# Patient Record
Sex: MALE | Race: WHITE | NOT HISPANIC OR LATINO
[De-identification: names, ages, dates, MRNs, and addresses within clinical notes are randomized per-mention and may not be internally consistent; named-entity substitution may affect disease eponyms.]

---

## 2017-04-14 ENCOUNTER — APPOINTMENT (OUTPATIENT)
Dept: ORTHOPEDIC SURGERY | Facility: CLINIC | Age: 62
End: 2017-04-14
Payer: COMMERCIAL

## 2017-04-14 VITALS
DIASTOLIC BLOOD PRESSURE: 85 MMHG | WEIGHT: 210 LBS | SYSTOLIC BLOOD PRESSURE: 134 MMHG | BODY MASS INDEX: 31.1 KG/M2 | HEIGHT: 69 IN

## 2017-04-14 DIAGNOSIS — M65.332 TRIGGER FINGER, LEFT MIDDLE FINGER: ICD-10-CM

## 2017-04-14 DIAGNOSIS — M65.351 TRIGGER FINGER, RIGHT LITTLE FINGER: ICD-10-CM

## 2017-04-14 PROCEDURE — 20550 NJX 1 TENDON SHEATH/LIGAMENT: CPT | Mod: F9

## 2017-04-14 PROCEDURE — 99204 OFFICE O/P NEW MOD 45 MIN: CPT | Mod: 25

## 2017-04-16 PROBLEM — M65.332 TRIGGER MIDDLE FINGER OF LEFT HAND: Status: ACTIVE | Noted: 2017-04-16

## 2017-04-16 PROBLEM — M65.351 TRIGGER LITTLE FINGER OF RIGHT HAND: Status: ACTIVE | Noted: 2017-04-16

## 2018-06-11 ENCOUNTER — APPOINTMENT (OUTPATIENT)
Age: 63
End: 2018-06-11
Payer: COMMERCIAL

## 2018-06-11 ENCOUNTER — APPOINTMENT (OUTPATIENT)
Dept: VASCULAR SURGERY | Facility: CLINIC | Age: 63
End: 2018-06-11
Payer: COMMERCIAL

## 2018-06-11 VITALS
HEIGHT: 69 IN | TEMPERATURE: 98 F | DIASTOLIC BLOOD PRESSURE: 91 MMHG | HEART RATE: 66 BPM | WEIGHT: 215 LBS | BODY MASS INDEX: 31.84 KG/M2 | SYSTOLIC BLOOD PRESSURE: 148 MMHG

## 2018-06-11 PROCEDURE — 93970 EXTREMITY STUDY: CPT

## 2018-06-11 PROCEDURE — 99204 OFFICE O/P NEW MOD 45 MIN: CPT

## 2018-12-14 ENCOUNTER — APPOINTMENT (OUTPATIENT)
Dept: ORTHOPEDIC SURGERY | Facility: CLINIC | Age: 63
End: 2018-12-14
Payer: COMMERCIAL

## 2018-12-14 VITALS
DIASTOLIC BLOOD PRESSURE: 77 MMHG | SYSTOLIC BLOOD PRESSURE: 125 MMHG | HEART RATE: 66 BPM | WEIGHT: 220 LBS | BODY MASS INDEX: 32.58 KG/M2 | HEIGHT: 69 IN

## 2018-12-14 DIAGNOSIS — M16.11 UNILATERAL PRIMARY OSTEOARTHRITIS, RIGHT HIP: ICD-10-CM

## 2018-12-14 PROCEDURE — 99214 OFFICE O/P EST MOD 30 MIN: CPT

## 2018-12-14 PROCEDURE — 73502 X-RAY EXAM HIP UNI 2-3 VIEWS: CPT | Mod: RT

## 2019-07-25 ENCOUNTER — LABORATORY RESULT (OUTPATIENT)
Age: 64
End: 2019-07-25

## 2019-07-26 ENCOUNTER — APPOINTMENT (OUTPATIENT)
Dept: INTERNAL MEDICINE | Facility: CLINIC | Age: 64
End: 2019-07-26
Payer: COMMERCIAL

## 2019-07-26 PROCEDURE — 36415 COLL VENOUS BLD VENIPUNCTURE: CPT

## 2019-07-30 ENCOUNTER — APPOINTMENT (OUTPATIENT)
Dept: INTERNAL MEDICINE | Facility: CLINIC | Age: 64
End: 2019-07-30
Payer: COMMERCIAL

## 2019-07-30 ENCOUNTER — NON-APPOINTMENT (OUTPATIENT)
Age: 64
End: 2019-07-30

## 2019-07-30 VITALS — DIASTOLIC BLOOD PRESSURE: 78 MMHG | SYSTOLIC BLOOD PRESSURE: 128 MMHG

## 2019-07-30 VITALS
HEART RATE: 57 BPM | DIASTOLIC BLOOD PRESSURE: 76 MMHG | HEIGHT: 69 IN | BODY MASS INDEX: 32.14 KG/M2 | SYSTOLIC BLOOD PRESSURE: 138 MMHG | WEIGHT: 217 LBS

## 2019-07-30 PROCEDURE — G0444 DEPRESSION SCREEN ANNUAL: CPT

## 2019-07-30 PROCEDURE — G0447 BEHAVIOR COUNSEL OBESITY 15M: CPT

## 2019-07-30 PROCEDURE — 93000 ELECTROCARDIOGRAM COMPLETE: CPT

## 2019-07-30 PROCEDURE — 99396 PREV VISIT EST AGE 40-64: CPT | Mod: 25

## 2019-07-30 PROCEDURE — G0442 ANNUAL ALCOHOL SCREEN 15 MIN: CPT

## 2019-07-30 PROCEDURE — 81003 URINALYSIS AUTO W/O SCOPE: CPT | Mod: NC,QW

## 2019-07-30 RX ORDER — MELOXICAM 15 MG/1
15 TABLET ORAL
Qty: 60 | Refills: 2 | Status: DISCONTINUED | COMMUNITY
Start: 2018-12-14 | End: 2019-07-30

## 2019-07-30 NOTE — HEALTH RISK ASSESSMENT
[Very Good] : ~his/her~  mood as very good [Yes] : Yes [Never (0 pts)] : Never (0 points) [No falls in past year] : Patient reported no falls in the past year [0] : 1) Little interest or pleasure doing things: Not at all (0) [None] : None [Alone] : lives alone [] :  [Employed] : employed [Smoke Detector] : smoke detector [Carbon Monoxide Detector] : carbon monoxide detector [Seat Belt] :  uses seat belt [Sunscreen] : uses sunscreen [de-identified] : raquet ball skiing, workout 2x a week [] : No [Audit-CScore] : 0 [Change in mental status noted] : No change in mental status noted [[id number]] : 0 [Reports changes in vision] : Reports no changes in vision [Reports changes in hearing] : Reports no changes in hearing [Reports changes in dental health] : Reports no changes in dental health [MammogramDate] : 2016 [ColonoscopyDate] : 10/2013 [FreeTextEntry2] : [employer] [FreeTextEntry3] : 1 grandson

## 2019-07-30 NOTE — COUNSELING
[ - Annual Depression Screening] : Annual Depression Screening [ - Annual Alcohol Misuse Screening] : Annual Alcohol Misuse Screening [ - Behavioral Counseling for Obesity (Face-to-Face for 15 Minutes)] : Behavioral Counseling for Obesity (Face-to-Face for 15 Minutes)

## 2019-07-30 NOTE — HISTORY OF PRESENT ILLNESS
[FreeTextEntry1] : feeling well, no gout attacks for at least last 6 months. \par  H/o DVT and pulmonary embolism 3 years ago mild residual swelling  right leg use supportive sock every day. Went skiing 1/2019- - very cold day and had difficulty catching breath.\par  Intermittent pain right leg shin on side, last 30 seconds and goes away.\par  Everything he eats gets lower abdominal gas pains. RLQ .

## 2019-08-07 ENCOUNTER — APPOINTMENT (OUTPATIENT)
Dept: CARDIOLOGY | Facility: CLINIC | Age: 64
End: 2019-08-07
Payer: COMMERCIAL

## 2019-08-07 PROCEDURE — 93015 CV STRESS TEST SUPVJ I&R: CPT

## 2019-08-07 PROCEDURE — 99214 OFFICE O/P EST MOD 30 MIN: CPT | Mod: 25

## 2019-08-14 LAB
25(OH)D3 SERPL-MCNC: 23 NG/ML
ALBUMIN SERPL ELPH-MCNC: 4.7 G/DL
ALP BLD-CCNC: 65 U/L
ALT SERPL-CCNC: 24 U/L
ANION GAP SERPL CALC-SCNC: 12 MMOL/L
AST SERPL-CCNC: 17 U/L
BASOPHILS # BLD AUTO: 0.07 K/UL
BASOPHILS NFR BLD AUTO: 0.9 %
BILIRUB SERPL-MCNC: 1 MG/DL
BILIRUB UR QL STRIP: NEGATIVE
BUN SERPL-MCNC: 24 MG/DL
CALCIUM SERPL-MCNC: 9.5 MG/DL
CHLORIDE SERPL-SCNC: 105 MMOL/L
CHOLEST SERPL-MCNC: 192 MG/DL
CHOLEST/HDLC SERPL: 4.5 RATIO
CO2 SERPL-SCNC: 25 MMOL/L
CREAT SERPL-MCNC: 1.19 MG/DL
EOSINOPHIL # BLD AUTO: 0.24 K/UL
EOSINOPHIL NFR BLD AUTO: 3.1 %
ESTIMATED AVERAGE GLUCOSE: 114 MG/DL
GLUCOSE SERPL-MCNC: 88 MG/DL
GLUCOSE UR-MCNC: NEGATIVE
HBA1C MFR BLD HPLC: 5.6 %
HCG UR QL: 0.2 EU/DL
HCT VFR BLD CALC: 49.9 %
HDLC SERPL-MCNC: 43 MG/DL
HGB BLD-MCNC: 16.4 G/DL
HGB UR QL STRIP.AUTO: NEGATIVE
IMM GRANULOCYTES NFR BLD AUTO: 0.3 %
KETONES UR-MCNC: NEGATIVE
LDLC SERPL CALC-MCNC: 120 MG/DL
LEUKOCYTE ESTERASE UR QL STRIP: NEGATIVE
LYMPHOCYTES # BLD AUTO: 2.54 K/UL
LYMPHOCYTES NFR BLD AUTO: 33.3 %
MAN DIFF?: NORMAL
MCHC RBC-ENTMCNC: 29.1 PG
MCHC RBC-ENTMCNC: 32.9 GM/DL
MCV RBC AUTO: 88.5 FL
MONOCYTES # BLD AUTO: 0.57 K/UL
MONOCYTES NFR BLD AUTO: 7.5 %
NEUTROPHILS # BLD AUTO: 4.18 K/UL
NEUTROPHILS NFR BLD AUTO: 54.9 %
NITRITE UR QL STRIP: NEGATIVE
PH UR STRIP: 6
PLATELET # BLD AUTO: 135 K/UL
POTASSIUM SERPL-SCNC: 5.4 MMOL/L
PROT SERPL-MCNC: 7 G/DL
PROT UR STRIP-MCNC: NEGATIVE
PSA SERPL-MCNC: 1.02 NG/ML
RBC # BLD: 5.64 M/UL
RBC # FLD: 12.7 %
SAVE SPECIMEN: NORMAL
SODIUM SERPL-SCNC: 142 MMOL/L
SP GR UR STRIP: 1.02
T4 FREE SERPL-MCNC: 1.1 NG/DL
T4 SERPL-MCNC: 6.9 UG/DL
TRIGL SERPL-MCNC: 144 MG/DL
TSH SERPL-ACNC: 1.86 UIU/ML
VIT B12 SERPL-MCNC: 613 PG/ML
WBC # FLD AUTO: 7.62 K/UL

## 2019-08-22 ENCOUNTER — APPOINTMENT (OUTPATIENT)
Dept: CARDIOLOGY | Facility: CLINIC | Age: 64
End: 2019-08-22

## 2019-08-22 ENCOUNTER — OUTPATIENT (OUTPATIENT)
Dept: OUTPATIENT SERVICES | Facility: HOSPITAL | Age: 64
LOS: 1 days | End: 2019-08-22
Payer: COMMERCIAL

## 2019-08-22 DIAGNOSIS — Z98.89 OTHER SPECIFIED POSTPROCEDURAL STATES: Chronic | ICD-10-CM

## 2019-08-22 DIAGNOSIS — R07.9 CHEST PAIN, UNSPECIFIED: ICD-10-CM

## 2019-08-22 PROCEDURE — 75574 CT ANGIO HRT W/3D IMAGE: CPT

## 2019-08-22 PROCEDURE — 75574 CT ANGIO HRT W/3D IMAGE: CPT | Mod: 26

## 2019-08-28 ENCOUNTER — APPOINTMENT (OUTPATIENT)
Dept: CARDIOLOGY | Facility: CLINIC | Age: 64
End: 2019-08-28
Payer: COMMERCIAL

## 2019-08-28 VITALS
SYSTOLIC BLOOD PRESSURE: 116 MMHG | WEIGHT: 219 LBS | DIASTOLIC BLOOD PRESSURE: 74 MMHG | BODY MASS INDEX: 32.34 KG/M2 | HEART RATE: 66 BPM

## 2019-08-28 DIAGNOSIS — L21.0 SEBORRHEA CAPITIS: ICD-10-CM

## 2019-08-28 PROCEDURE — 99214 OFFICE O/P EST MOD 30 MIN: CPT

## 2019-08-28 NOTE — HISTORY OF PRESENT ILLNESS
[FreeTextEntry1] : 63M remote hx of DVT/PE, who presents for follow up of abnormal stress test, cardiac CTA. Patient had an episode of dyspnea while skiing in very cold weather earlier this year. Underwent exercise stress test which was negative for ischemia but was noted with ventricular bigeminy during recovery. He subsequently underwent cardiac CTA which showed mild diffuse CAD. Currently feels well without chest pain, shortness of breath, palpitations, dizziness, syncope. He is pending colonoscopy.  Can walk up 2 flights of stairs without difficulty. \par \par In 2015, he developed a DVT and subsequent saddle PE after a flight to [place]. Had IVC filter placed and was put on Xarelto. Subsequently was taken off blood thinners and had filter removed. Thrombophilia workup was negative. Cancer screening at that time was negative. He is maintained on daily aspirin.\par \par Denies family hx of heart disease. Father had DVT. Owns a warehouse. Nonsmoker.  Works as  during the summer. \par \par ECG: SR, no ST-T wave changes\par EST 8/2019: 7:00 mins, no ischemia, Ventricular bigeminy during recovery\par TTE 2016: Normal LV function, mild LAE\par \par Cardiac CTA: CAC score 13\par LM <30%\par pLAD 30-49%\par mLAD <30%\par D1 <30%\par RI <30%\par LCx 30-49%\par RCA: Normal\par \par Asa 81mg

## 2019-08-28 NOTE — REVIEW OF SYSTEMS
[Fever] : no fever [Chills] : no chills [Shortness Of Breath] : no shortness of breath [Chest Pain] : no chest pain [Palpitations] : no palpitations [Negative] : Psychiatric

## 2019-08-28 NOTE — PHYSICAL EXAM
[General Appearance - Well Developed] : well developed [Normal Appearance] : normal appearance [Well Groomed] : well groomed [General Appearance - Well Nourished] : well nourished [No Deformities] : no deformities [General Appearance - In No Acute Distress] : no acute distress [Normal Conjunctiva] : the conjunctiva exhibited no abnormalities [Eyelids - No Xanthelasma] : the eyelids demonstrated no xanthelasmas [Normal Oral Mucosa] : normal oral mucosa [No Oral Pallor] : no oral pallor [No Oral Cyanosis] : no oral cyanosis [Normal Jugular Venous A Waves Present] : normal jugular venous A waves present [Normal Jugular Venous V Waves Present] : normal jugular venous V waves present [No Jugular Venous Diaz A Waves] : no jugular venous diaz A waves [Heart Rate And Rhythm] : heart rate and rhythm were normal [Heart Sounds] : normal S1 and S2 [Murmurs] : no murmurs present [Edema] : no peripheral edema present [Respiration, Rhythm And Depth] : normal respiratory rhythm and effort [Exaggerated Use Of Accessory Muscles For Inspiration] : no accessory muscle use [Auscultation Breath Sounds / Voice Sounds] : lungs were clear to auscultation bilaterally [Abdomen Soft] : soft [Abdomen Tenderness] : non-tender [Abdomen Mass (___ Cm)] : no abdominal mass palpated [Abnormal Walk] : normal gait [Gait - Sufficient For Exercise Testing] : the gait was sufficient for exercise testing [Nail Clubbing] : no clubbing of the fingernails [Cyanosis, Localized] : no localized cyanosis [Petechial Hemorrhages (___cm)] : no petechial hemorrhages [Skin Color & Pigmentation] : normal skin color and pigmentation [] : no rash [No Venous Stasis] : no venous stasis [Skin Lesions] : no skin lesions [No Skin Ulcers] : no skin ulcer [No Xanthoma] : no  xanthoma was observed [Oriented To Time, Place, And Person] : oriented to person, place, and time [Affect] : the affect was normal [Mood] : the mood was normal [No Anxiety] : not feeling anxious

## 2019-08-28 NOTE — DISCUSSION/SUMMARY
[FreeTextEntry1] : 63M with nonobstructive CAD on CTA, hx of DVT/PE\par \par 1. CAD:  Mild, nonobstructive. Asymptomatic. \par \par -Cont asa 81mg, start crestor 10mg\par -Trend lipids, CMP\par \par 2. DVT/PE:  Seemingly provoked from long plane flight. Father had a DVT.  Has chosen to dc AC without any subsequent issues.  \par \par -Cont asa 81mg\par \par This patient is able to perform >4 METS of activity without limitation. No evidence of ongoing ischemia, arrhythmia, valvular heart disease or decompensated heart failure.  This patient is optimized from a cardiac standpoint for this low risk surgery.  No further cardiac testing is necessary prior to surgery. \par \par

## 2019-08-29 RX ORDER — ROSUVASTATIN CALCIUM 10 MG/1
10 TABLET, FILM COATED ORAL
Qty: 90 | Refills: 3 | Status: DISCONTINUED | COMMUNITY
Start: 2019-08-28 | End: 2019-08-29

## 2019-09-12 ENCOUNTER — RECORD ABSTRACTING (OUTPATIENT)
Age: 64
End: 2019-09-12

## 2019-09-12 PROBLEM — Z86.718 HISTORY OF DEEP VENOUS THROMBOSIS: Status: RESOLVED | Noted: 2019-09-12 | Resolved: 2019-09-12

## 2019-09-12 PROBLEM — Z86.711 HISTORY OF PULMONARY EMBOLISM: Status: RESOLVED | Noted: 2019-09-12 | Resolved: 2019-09-12

## 2019-09-12 PROBLEM — K64.4 EXTERNAL HEMORRHOIDS: Status: ACTIVE | Noted: 2019-09-12

## 2019-09-12 PROBLEM — R23.4 SCAB: Status: RESOLVED | Noted: 2019-09-12 | Resolved: 2019-09-12

## 2019-09-12 PROBLEM — Z83.79 FAMILY HISTORY OF GALLBLADDER DISEASE: Status: ACTIVE | Noted: 2019-09-12

## 2019-09-12 PROBLEM — Z87.09 HISTORY OF UPPER RESPIRATORY INFECTION: Status: RESOLVED | Noted: 2019-09-12 | Resolved: 2019-09-12

## 2019-09-13 ENCOUNTER — APPOINTMENT (OUTPATIENT)
Dept: INTERNAL MEDICINE | Facility: AMBULATORY MEDICAL SERVICES | Age: 64
End: 2019-09-13

## 2019-09-13 ENCOUNTER — APPOINTMENT (OUTPATIENT)
Dept: INTERNAL MEDICINE | Facility: AMBULATORY MEDICAL SERVICES | Age: 64
End: 2019-09-13
Payer: COMMERCIAL

## 2019-09-13 DIAGNOSIS — Z83.79 FAMILY HISTORY OF OTHER DISEASES OF THE DIGESTIVE SYSTEM: ICD-10-CM

## 2019-09-13 DIAGNOSIS — K64.4 RESIDUAL HEMORRHOIDAL SKIN TAGS: ICD-10-CM

## 2019-09-13 DIAGNOSIS — R23.4 CHANGES IN SKIN TEXTURE: ICD-10-CM

## 2019-09-13 DIAGNOSIS — Z86.711 PERSONAL HISTORY OF PULMONARY EMBOLISM: ICD-10-CM

## 2019-09-13 DIAGNOSIS — Z87.09 PERSONAL HISTORY OF OTHER DISEASES OF THE RESPIRATORY SYSTEM: ICD-10-CM

## 2019-09-13 DIAGNOSIS — Z86.718 PERSONAL HISTORY OF OTHER VENOUS THROMBOSIS AND EMBOLISM: ICD-10-CM

## 2019-09-13 PROCEDURE — 43239 EGD BIOPSY SINGLE/MULTIPLE: CPT

## 2019-09-13 PROCEDURE — G0121 COLON CA SCRN NOT HI RSK IND: CPT | Mod: PT

## 2019-09-15 ENCOUNTER — EMERGENCY (EMERGENCY)
Facility: HOSPITAL | Age: 64
LOS: 1 days | Discharge: HOME | End: 2019-09-15
Attending: STUDENT IN AN ORGANIZED HEALTH CARE EDUCATION/TRAINING PROGRAM
Payer: COMMERCIAL

## 2019-09-15 VITALS
HEART RATE: 66 BPM | TEMPERATURE: 98 F | SYSTOLIC BLOOD PRESSURE: 160 MMHG | OXYGEN SATURATION: 99 % | DIASTOLIC BLOOD PRESSURE: 94 MMHG | HEIGHT: 69 IN | RESPIRATION RATE: 22 BRPM | WEIGHT: 216.93 LBS

## 2019-09-15 VITALS
RESPIRATION RATE: 16 BRPM | TEMPERATURE: 98 F | OXYGEN SATURATION: 98 % | HEART RATE: 78 BPM | SYSTOLIC BLOOD PRESSURE: 136 MMHG | DIASTOLIC BLOOD PRESSURE: 88 MMHG

## 2019-09-15 DIAGNOSIS — Z98.89 OTHER SPECIFIED POSTPROCEDURAL STATES: Chronic | ICD-10-CM

## 2019-09-15 LAB
ALBUMIN SERPL ELPH-MCNC: 4.3 G/DL — SIGNIFICANT CHANGE UP (ref 3.3–5)
ALP SERPL-CCNC: 63 U/L — SIGNIFICANT CHANGE UP (ref 40–120)
ALT FLD-CCNC: 39 U/L — SIGNIFICANT CHANGE UP (ref 10–45)
ANION GAP SERPL CALC-SCNC: 16 MMOL/L — SIGNIFICANT CHANGE UP (ref 5–17)
APPEARANCE UR: CLEAR — SIGNIFICANT CHANGE UP
AST SERPL-CCNC: 29 U/L — SIGNIFICANT CHANGE UP (ref 10–40)
BACTERIA # UR AUTO: NEGATIVE — SIGNIFICANT CHANGE UP
BASE EXCESS BLDV CALC-SCNC: -1.3 MMOL/L — SIGNIFICANT CHANGE UP (ref -2–2)
BASE EXCESS BLDV CALC-SCNC: 1.5 MMOL/L — SIGNIFICANT CHANGE UP (ref -2–2)
BASOPHILS # BLD AUTO: 0.1 K/UL — SIGNIFICANT CHANGE UP (ref 0–0.2)
BASOPHILS NFR BLD AUTO: 0.7 % — SIGNIFICANT CHANGE UP (ref 0–2)
BILIRUB SERPL-MCNC: 0.4 MG/DL — SIGNIFICANT CHANGE UP (ref 0.2–1.2)
BILIRUB UR-MCNC: NEGATIVE — SIGNIFICANT CHANGE UP
BUN SERPL-MCNC: 26 MG/DL — HIGH (ref 7–23)
CA-I SERPL-SCNC: 1.11 MMOL/L — LOW (ref 1.12–1.3)
CA-I SERPL-SCNC: 1.11 MMOL/L — LOW (ref 1.12–1.3)
CALCIUM SERPL-MCNC: 9.2 MG/DL — SIGNIFICANT CHANGE UP (ref 8.4–10.5)
CHLORIDE BLDV-SCNC: 106 MMOL/L — SIGNIFICANT CHANGE UP (ref 96–108)
CHLORIDE BLDV-SCNC: 108 MMOL/L — SIGNIFICANT CHANGE UP (ref 96–108)
CHLORIDE SERPL-SCNC: 100 MMOL/L — SIGNIFICANT CHANGE UP (ref 96–108)
CO2 BLDV-SCNC: 23 MMOL/L — SIGNIFICANT CHANGE UP (ref 22–30)
CO2 BLDV-SCNC: 26 MMOL/L — SIGNIFICANT CHANGE UP (ref 22–30)
CO2 SERPL-SCNC: 22 MMOL/L — SIGNIFICANT CHANGE UP (ref 22–31)
COLOR SPEC: YELLOW — SIGNIFICANT CHANGE UP
CREAT SERPL-MCNC: 1.38 MG/DL — HIGH (ref 0.5–1.3)
DIFF PNL FLD: ABNORMAL
EOSINOPHIL # BLD AUTO: 0.1 K/UL — SIGNIFICANT CHANGE UP (ref 0–0.5)
EOSINOPHIL NFR BLD AUTO: 1.4 % — SIGNIFICANT CHANGE UP (ref 0–6)
EPI CELLS # UR: 1 /HPF — SIGNIFICANT CHANGE UP
GAS PNL BLDV: 139 MMOL/L — SIGNIFICANT CHANGE UP (ref 135–145)
GAS PNL BLDV: 140 MMOL/L — SIGNIFICANT CHANGE UP (ref 135–145)
GAS PNL BLDV: SIGNIFICANT CHANGE UP
GLUCOSE BLDV-MCNC: 153 MG/DL — HIGH (ref 70–99)
GLUCOSE BLDV-MCNC: 155 MG/DL — HIGH (ref 70–99)
GLUCOSE SERPL-MCNC: 156 MG/DL — HIGH (ref 70–99)
GLUCOSE UR QL: NEGATIVE — SIGNIFICANT CHANGE UP
HCO3 BLDV-SCNC: 22 MMOL/L — SIGNIFICANT CHANGE UP (ref 21–29)
HCO3 BLDV-SCNC: 25 MMOL/L — SIGNIFICANT CHANGE UP (ref 21–29)
HCT VFR BLD CALC: 49.8 % — SIGNIFICANT CHANGE UP (ref 39–50)
HCT VFR BLDA CALC: 50 % — SIGNIFICANT CHANGE UP (ref 39–50)
HCT VFR BLDA CALC: 50 % — SIGNIFICANT CHANGE UP (ref 39–50)
HGB BLD CALC-MCNC: 16.2 G/DL — SIGNIFICANT CHANGE UP (ref 13–17)
HGB BLD CALC-MCNC: 16.2 G/DL — SIGNIFICANT CHANGE UP (ref 13–17)
HGB BLD-MCNC: 16.3 G/DL — SIGNIFICANT CHANGE UP (ref 13–17)
HYALINE CASTS # UR AUTO: 1 /LPF — SIGNIFICANT CHANGE UP (ref 0–2)
KETONES UR-MCNC: NEGATIVE — SIGNIFICANT CHANGE UP
LACTATE BLDV-MCNC: 4 MMOL/L — CRITICAL HIGH (ref 0.7–2)
LACTATE BLDV-MCNC: 4.2 MMOL/L — CRITICAL HIGH (ref 0.7–2)
LEUKOCYTE ESTERASE UR-ACNC: NEGATIVE — SIGNIFICANT CHANGE UP
LIDOCAIN IGE QN: 22 U/L — SIGNIFICANT CHANGE UP (ref 7–60)
LYMPHOCYTES # BLD AUTO: 2.6 K/UL — SIGNIFICANT CHANGE UP (ref 1–3.3)
LYMPHOCYTES # BLD AUTO: 29.5 % — SIGNIFICANT CHANGE UP (ref 13–44)
MCHC RBC-ENTMCNC: 29.1 PG — SIGNIFICANT CHANGE UP (ref 27–34)
MCHC RBC-ENTMCNC: 32.9 GM/DL — SIGNIFICANT CHANGE UP (ref 32–36)
MCV RBC AUTO: 88.7 FL — SIGNIFICANT CHANGE UP (ref 80–100)
MONOCYTES # BLD AUTO: 0.6 K/UL — SIGNIFICANT CHANGE UP (ref 0–0.9)
MONOCYTES NFR BLD AUTO: 6.9 % — SIGNIFICANT CHANGE UP (ref 2–14)
NEUTROPHILS # BLD AUTO: 5.5 K/UL — SIGNIFICANT CHANGE UP (ref 1.8–7.4)
NEUTROPHILS NFR BLD AUTO: 61.6 % — SIGNIFICANT CHANGE UP (ref 43–77)
NITRITE UR-MCNC: NEGATIVE — SIGNIFICANT CHANGE UP
OTHER CELLS CSF MANUAL: 13 ML/DL — LOW (ref 18–22)
OTHER CELLS CSF MANUAL: 16 ML/DL — LOW (ref 18–22)
PCO2 BLDV: 36 MMHG — SIGNIFICANT CHANGE UP (ref 35–50)
PCO2 BLDV: 39 MMHG — SIGNIFICANT CHANGE UP (ref 35–50)
PH BLDV: 7.41 — SIGNIFICANT CHANGE UP (ref 7.35–7.45)
PH BLDV: 7.43 — SIGNIFICANT CHANGE UP (ref 7.35–7.45)
PH UR: 6 — SIGNIFICANT CHANGE UP (ref 5–8)
PLATELET # BLD AUTO: 128 K/UL — LOW (ref 150–400)
PO2 BLDV: 30 MMHG — SIGNIFICANT CHANGE UP (ref 25–45)
PO2 BLDV: 39 MMHG — SIGNIFICANT CHANGE UP (ref 25–45)
POTASSIUM BLDV-SCNC: 3.8 MMOL/L — SIGNIFICANT CHANGE UP (ref 3.5–5.3)
POTASSIUM BLDV-SCNC: 3.8 MMOL/L — SIGNIFICANT CHANGE UP (ref 3.5–5.3)
POTASSIUM SERPL-MCNC: 4.2 MMOL/L — SIGNIFICANT CHANGE UP (ref 3.5–5.3)
POTASSIUM SERPL-SCNC: 4.2 MMOL/L — SIGNIFICANT CHANGE UP (ref 3.5–5.3)
PROT SERPL-MCNC: 7.1 G/DL — SIGNIFICANT CHANGE UP (ref 6–8.3)
PROT UR-MCNC: ABNORMAL
RBC # BLD: 5.61 M/UL — SIGNIFICANT CHANGE UP (ref 4.2–5.8)
RBC # FLD: 11.8 % — SIGNIFICANT CHANGE UP (ref 10.3–14.5)
RBC CASTS # UR COMP ASSIST: 195 /HPF — HIGH (ref 0–4)
SAO2 % BLDV: 58 % — LOW (ref 67–88)
SAO2 % BLDV: 73 % — SIGNIFICANT CHANGE UP (ref 67–88)
SODIUM SERPL-SCNC: 138 MMOL/L — SIGNIFICANT CHANGE UP (ref 135–145)
SP GR SPEC: 1.02 — SIGNIFICANT CHANGE UP (ref 1.01–1.02)
UROBILINOGEN FLD QL: NEGATIVE — SIGNIFICANT CHANGE UP
WBC # BLD: 9 K/UL — SIGNIFICANT CHANGE UP (ref 3.8–10.5)
WBC # FLD AUTO: 9 K/UL — SIGNIFICANT CHANGE UP (ref 3.8–10.5)
WBC UR QL: 3 /HPF — SIGNIFICANT CHANGE UP (ref 0–5)

## 2019-09-15 PROCEDURE — 99284 EMERGENCY DEPT VISIT MOD MDM: CPT | Mod: 25

## 2019-09-15 PROCEDURE — 80053 COMPREHEN METABOLIC PANEL: CPT

## 2019-09-15 PROCEDURE — 82330 ASSAY OF CALCIUM: CPT

## 2019-09-15 PROCEDURE — 96375 TX/PRO/DX INJ NEW DRUG ADDON: CPT | Mod: XU

## 2019-09-15 PROCEDURE — 96376 TX/PRO/DX INJ SAME DRUG ADON: CPT | Mod: XU

## 2019-09-15 PROCEDURE — 74174 CTA ABD&PLVS W/CONTRAST: CPT

## 2019-09-15 PROCEDURE — 83605 ASSAY OF LACTIC ACID: CPT

## 2019-09-15 PROCEDURE — 99285 EMERGENCY DEPT VISIT HI MDM: CPT

## 2019-09-15 PROCEDURE — 99283 EMERGENCY DEPT VISIT LOW MDM: CPT

## 2019-09-15 PROCEDURE — 83690 ASSAY OF LIPASE: CPT

## 2019-09-15 PROCEDURE — 93005 ELECTROCARDIOGRAM TRACING: CPT

## 2019-09-15 PROCEDURE — 82947 ASSAY GLUCOSE BLOOD QUANT: CPT

## 2019-09-15 PROCEDURE — 82435 ASSAY OF BLOOD CHLORIDE: CPT

## 2019-09-15 PROCEDURE — 96361 HYDRATE IV INFUSION ADD-ON: CPT

## 2019-09-15 PROCEDURE — 87086 URINE CULTURE/COLONY COUNT: CPT

## 2019-09-15 PROCEDURE — 84295 ASSAY OF SERUM SODIUM: CPT

## 2019-09-15 PROCEDURE — 74176 CT ABD & PELVIS W/O CONTRAST: CPT | Mod: 26

## 2019-09-15 PROCEDURE — 96374 THER/PROPH/DIAG INJ IV PUSH: CPT | Mod: XU

## 2019-09-15 PROCEDURE — 85027 COMPLETE CBC AUTOMATED: CPT

## 2019-09-15 PROCEDURE — 82803 BLOOD GASES ANY COMBINATION: CPT

## 2019-09-15 PROCEDURE — 84132 ASSAY OF SERUM POTASSIUM: CPT

## 2019-09-15 PROCEDURE — 81001 URINALYSIS AUTO W/SCOPE: CPT

## 2019-09-15 PROCEDURE — 74174 CTA ABD&PLVS W/CONTRAST: CPT | Mod: 26

## 2019-09-15 PROCEDURE — 85014 HEMATOCRIT: CPT

## 2019-09-15 PROCEDURE — 74176 CT ABD & PELVIS W/O CONTRAST: CPT

## 2019-09-15 RX ORDER — MORPHINE SULFATE 50 MG/1
4 CAPSULE, EXTENDED RELEASE ORAL ONCE
Refills: 0 | Status: DISCONTINUED | OUTPATIENT
Start: 2019-09-15 | End: 2019-09-15

## 2019-09-15 RX ORDER — ONDANSETRON 8 MG/1
4 TABLET, FILM COATED ORAL ONCE
Refills: 0 | Status: COMPLETED | OUTPATIENT
Start: 2019-09-15 | End: 2019-09-15

## 2019-09-15 RX ORDER — SODIUM CHLORIDE 9 MG/ML
1000 INJECTION INTRAMUSCULAR; INTRAVENOUS; SUBCUTANEOUS ONCE
Refills: 0 | Status: COMPLETED | OUTPATIENT
Start: 2019-09-15 | End: 2019-09-15

## 2019-09-15 RX ORDER — KETOROLAC TROMETHAMINE 30 MG/ML
15 SYRINGE (ML) INJECTION ONCE
Refills: 0 | Status: DISCONTINUED | OUTPATIENT
Start: 2019-09-15 | End: 2019-09-15

## 2019-09-15 RX ORDER — SODIUM CHLORIDE 9 MG/ML
2000 INJECTION INTRAMUSCULAR; INTRAVENOUS; SUBCUTANEOUS ONCE
Refills: 0 | Status: COMPLETED | OUTPATIENT
Start: 2019-09-15 | End: 2019-09-15

## 2019-09-15 RX ADMIN — Medication 15 MILLIGRAM(S): at 11:12

## 2019-09-15 RX ADMIN — SODIUM CHLORIDE 1000 MILLILITER(S): 9 INJECTION INTRAMUSCULAR; INTRAVENOUS; SUBCUTANEOUS at 06:35

## 2019-09-15 RX ADMIN — SODIUM CHLORIDE 2000 MILLILITER(S): 9 INJECTION INTRAMUSCULAR; INTRAVENOUS; SUBCUTANEOUS at 09:35

## 2019-09-15 RX ADMIN — MORPHINE SULFATE 4 MILLIGRAM(S): 50 CAPSULE, EXTENDED RELEASE ORAL at 08:00

## 2019-09-15 RX ADMIN — ONDANSETRON 4 MILLIGRAM(S): 8 TABLET, FILM COATED ORAL at 05:52

## 2019-09-15 RX ADMIN — SODIUM CHLORIDE 1000 MILLILITER(S): 9 INJECTION INTRAMUSCULAR; INTRAVENOUS; SUBCUTANEOUS at 06:31

## 2019-09-15 RX ADMIN — MORPHINE SULFATE 4 MILLIGRAM(S): 50 CAPSULE, EXTENDED RELEASE ORAL at 09:50

## 2019-09-15 RX ADMIN — MORPHINE SULFATE 4 MILLIGRAM(S): 50 CAPSULE, EXTENDED RELEASE ORAL at 07:50

## 2019-09-15 RX ADMIN — MORPHINE SULFATE 4 MILLIGRAM(S): 50 CAPSULE, EXTENDED RELEASE ORAL at 08:12

## 2019-09-15 RX ADMIN — SODIUM CHLORIDE 1000 MILLILITER(S): 9 INJECTION INTRAMUSCULAR; INTRAVENOUS; SUBCUTANEOUS at 05:55

## 2019-09-15 RX ADMIN — MORPHINE SULFATE 4 MILLIGRAM(S): 50 CAPSULE, EXTENDED RELEASE ORAL at 05:53

## 2019-09-15 RX ADMIN — MORPHINE SULFATE 4 MILLIGRAM(S): 50 CAPSULE, EXTENDED RELEASE ORAL at 06:31

## 2019-09-15 NOTE — ED PROVIDER NOTE - ATTENDING CONTRIBUTION TO CARE
64 M presents w/ wife   has hx of kidney stones, required lithotripsy in the past presents today w/ L flank pain that is severe woke him up from sleep colicky radiating to the front of abdomen,  no fevers, no chills  no vomiting  on exam, pt is awake alert in severe distress has soft abdomen, L flank pain,   pt has clear lungs, bilateral 2+ radial and DP pulses. plan for labs, urine imaging and pain control, likely nephrolithiasis.   case signed out to Dr. Faith at 0700 on Sept 15, 2019 pending ct read,   pt may require observation stay given intense pain he was experiencing for further monitoring, 2 rounds of IV morphine given.

## 2019-09-15 NOTE — ED PROVIDER NOTE - NSFOLLOWUPCLINICS_GEN_ALL_ED_FT
[facility] - Urology  Urology  [address], [address] & [address] [place] [address]  Phone: [phone]  Fax:   Follow Up Time:

## 2019-09-15 NOTE — ED PROVIDER NOTE - OBJECTIVE STATEMENT
64M PMH renal stones, recent colposcopy w/ biopsy 2 days ago p/w LLQ pain. Pt states he woke up from LLQ pain, crampy, severe, radiating to back. Associated with nausea, 1x NBNB vomiting. No fevers, chills, no blood in urine/stool. States feels like previous renal stones.

## 2019-09-15 NOTE — ED ADULT NURSE REASSESSMENT NOTE - NS ED NURSE REASSESS COMMENT FT1
1909 pt states 5/10 on his pain, more manageable, pending final dispo/no distress noted/family at bedside

## 2019-09-15 NOTE — ED PROVIDER NOTE - PHYSICAL EXAMINATION
General: Well developed, well nourished, uncomfortable appearing   HEENT: Normocephalic and atraumatic, EOMI, Trachea midline.   Cardiac: Normal S1 and S2 w/ RRR. No MRG.  Pulmonary: CTA bilaterally. No increased WOB.   Abdominal: Soft, NTND, No CVA tenderness.   Neurologic: No focal sensory or motor deficits.  Musculoskeletal: No limited ROM.  Vascular: Warm and well perfused  Skin: Color appropriate for race.   Psychiatric: Appropriate mood and affect. No apparent risk to self or others.  García Mireles, PGY-2 General: Well developed, well nourished, uncomfortable appearing   HEENT: Normocephalic and atraumatic, EOMI, Trachea midline.   Cardiac: Normal S1 and S2 w/ RRR. No MRG.  Pulmonary: CTA bilaterally. No increased WOB.   Abdominal: Soft, NTND, L CVA tenderness.   Neurologic: No focal sensory or motor deficits.  Musculoskeletal: No limited ROM.  Vascular: Warm and well perfused  Skin: Color appropriate for race.   Psychiatric: Appropriate mood and affect. No apparent risk to self or others.  García Mireles, PGY-2

## 2019-09-15 NOTE — ED ADULT NURSE NOTE - CAS ELECT INFOMATION PROVIDED
DC instructions/Pt discharged, VSS, afebrile, ambulating independently. Nurse clearly reviewed discharge instructions, followup care, and when to return to the ED - Pt verbalized understanding.

## 2019-09-15 NOTE — CONSULT NOTE ADULT - SUBJECTIVE AND OBJECTIVE BOX
HPI:  65yo male h/o PE/DVT in past, on aspirin, and kidney stones, presenting to ED complaining of LLQ pain since late last night, found to have a 2mm left distal stone on CT. Pt states pain is LLQ and radiates to left flank. Pain is associated with chills and nausea. Denies vomiting, fevers, dysuria, hematuria, frequency or difficulty emptying bladder. Last stone was about 5 years ago, passed on own. Also required ESWL about 18 years ago. Pt also underwent recent colonoscopy 2 days ago with polyp removal. No post procedure abx.   States pain is currently 5/10.     CT in ED shows a 2mm left distal stone with mild hydro.     PAST MEDICAL & SURGICAL HISTORY:  Epistaxis  Gout: right toe  Saddle embolus:   Pulmonary embolism  Herniated lumbar intervertebral disc  Spinal stenosis  DVT (deep venous thrombosis)  S/P IVC filter: located right groin   place on 2015  History of orthopedic surgery    FAMILY HISTORY:  Family history of lymphoma (Sibling)  Family history of DVT (Father, Grandparent)    SOCIAL HISTORY:   Tobacco hx:    MEDICATIONS  (STANDING):  ketorolac   Injectable 15 milliGRAM(s) IV Push Once  sodium chloride 0.9% Bolus 2000 milliLiter(s) IV Bolus once    MEDICATIONS  (PRN):    Allergies    No Known Allergies    Intolerances        REVIEW OF SYSTEMS: Pertinent positives and negatives as stated in HPI, otherwise negative    Vital signs  T(C): 36.9 (09-15-19 @ 08:58), Max: 36.9 (09-15-19 @ 08:58)  HR: 78 (09-15-19 @ 08:58)  BP: 161/92 (09-15-19 @ 08:58)  SpO2: 98% (09-15-19 @ 08:58)  Wt(kg): --    Output    UOP    Physical Exam  Gen: NAD  Pulm: No respiratory distress, no subcostal retractions  CV: RRR, no JVD  Abd: Soft, NT, ND, no CVAT    LABS:          09-15 @ 06:12    WBC 9.0   / Hct 49.8  / SCr 1.38     09-15    138  |  100  |  26<H>  ----------------------------<  156<H>  4.2   |  22  |  1.38<H>    Ca    9.2      15 Sep 2019 06:12    TPro  7.1  /  Alb  4.3  /  TBili  0.4  /  DBili  x   /  AST  29  /  ALT  39  /  AlkPhos  63  09-15      Urinalysis Basic - ( 15 Sep 2019 06:12 )    Color: Yellow / Appearance: Clear / S.025 / pH: x  Gluc: x / Ketone: Negative  / Bili: Negative / Urobili: Negative   Blood: x / Protein: 30 mg/dL / Nitrite: Negative   Leuk Esterase: Negative / RBC: 195 /hpf / WBC 3 /HPF   Sq Epi: x / Non Sq Epi: 1 /hpf / Bacteria: Negative        Urine Cx: pending     RADIOLOGY:    < from: CT Abdomen and Pelvis No Cont (09.15.19 @ 06:51) >  FINDINGS:    LOWER CHEST: Subsegmental atelectasis.    LIVER: Within normal limits.  BILE DUCTS: Normal caliber.  GALLBLADDER: Within normal limits.  SPLEEN: Within normal limits.  PANCREAS: Within normal limits.  ADRENALS: Within normal limits.  KIDNEYS/URETERS:  A 2 mm calculi within the distal portion of the left   ureter. Multiple other punctate nonobstructing left renal calculi.   Multiple nonobstructing right renal calculi with the largest in the lower   pole measuring 7 mm. mild left hydroureteronephrosis. Bilateral   nonspecific perinephric stranding.    BLADDER: Within normal limits.  REPRODUCTIVE ORGANS: Prostate within normal limits.    BOWEL: Sigmoid diverticulosis. No bowel obstruction. Appendix is normal.  PERITONEUM: No ascites.  VESSELS: Atherosclerotic changes.  RETROPERITONEUM/LYMPH NODES: No lymphadenopathy.    ABDOMINAL WALL: Fat-containing umbilical hernia.  BONES: Degenerative changes.    IMPRESSION:   A 2 mm calculi within the distal portion of the left ureter. Mild left   hydroureteronephrosis.    Multiple other bilateral nonobstructing renal calculi with the largest   measuring 7 mm in the right renal lower pole.         < end of copied text >

## 2019-09-15 NOTE — ED PROVIDER NOTE - PROGRESS NOTE DETAILS
UA notable for hematuria, elevated creatinine. Pending CT results. May require observation for pain control.   aGrcía Mireles, PGY-2 Leoncio Lopez DO PGY2 - Pt now resting comforatbly pain free. Lactate downtrending. Per patient had metallic clip placed in colon during colonscopy 2 days ago which explains radiology  findings. Safe for dc. pt agrees Bceca Quiroz MD s/p morphine reports feeling improved, pt is more comfortable and resting comfortably, abdominal exam- remains soft nd

## 2019-09-15 NOTE — CONSULT NOTE ADULT - ASSESSMENT
63yo male with LLQ pain radiating to left flank, CT showing a 2mm distal left stone with mild hydro, also elevated lactate of 4, but no fevers/leukocytosis, UA clean  - hydrate/IVF  - repeat labs, trend lactate   - pain control  - flomax  - strain urine  - f/u CTA abd/pelvis- ED concern for mesenteric ischemia given elevated lactate and pain, also DVT/PE history   - CDU if only finding is nephrolithiasis   - discussed with Dr. KYLE Salazar

## 2019-09-15 NOTE — ED ADULT NURSE NOTE - NS ED NURSE LEVEL OF CONSCIOUSNESS ORIENTATION
Oriented - self; Oriented - place; Oriented - time

## 2019-09-15 NOTE — ED PROVIDER NOTE - NSFOLLOWUPINSTRUCTIONS_ED_ALL_ED_FT
- Follow up with a urologist this week if symptoms persist    - Lab and imaging results, if performed, were discussed with you along with your discharge diagnosis    - Follow up with your doctor in 1 week - bring copies of your results if you were given. If you do not have a primary doctor, please call [phone] to find one convenient for you    - Return to the ED for any new, worsening, or concerning symptoms to you    - Continue all prescribed medications    - Take ibuprofen/tylenol as directed as needed for pain    - Rest and keep yourself hydrated with fluids

## 2019-09-15 NOTE — ED PROVIDER NOTE - PATIENT PORTAL LINK FT
You can access the FollowMyHealth Patient Portal offered by [facility] by registering at the following website: [email or web address]. By joining [email or web address] FollowMyHealth portal, you will also be able to view your health information using other applications (apps) compatible with our system.

## 2019-09-15 NOTE — ED ADULT NURSE NOTE - PMH
DVT (deep venous thrombosis)    Epistaxis    Gout  right toe  Herniated lumbar intervertebral disc    Pulmonary embolism    Saddle embolus  sept 16th  Spinal stenosis

## 2019-09-16 LAB
CULTURE RESULTS: SIGNIFICANT CHANGE UP
SPECIMEN SOURCE: SIGNIFICANT CHANGE UP

## 2019-10-03 ENCOUNTER — LABORATORY RESULT (OUTPATIENT)
Age: 64
End: 2019-10-03

## 2019-10-03 ENCOUNTER — APPOINTMENT (OUTPATIENT)
Dept: INTERNAL MEDICINE | Facility: CLINIC | Age: 64
End: 2019-10-03
Payer: COMMERCIAL

## 2019-10-03 VITALS
HEIGHT: 69 IN | DIASTOLIC BLOOD PRESSURE: 81 MMHG | BODY MASS INDEX: 32.44 KG/M2 | SYSTOLIC BLOOD PRESSURE: 136 MMHG | HEART RATE: 68 BPM | OXYGEN SATURATION: 95 % | WEIGHT: 219 LBS

## 2019-10-03 DIAGNOSIS — J45.909 UNSPECIFIED ASTHMA, UNCOMPLICATED: ICD-10-CM

## 2019-10-03 LAB
BILIRUB UR QL STRIP: NEGATIVE
GLUCOSE UR-MCNC: NEGATIVE
HCG UR QL: 0.2 EU/DL
HGB UR QL STRIP.AUTO: NEGATIVE
KETONES UR-MCNC: NEGATIVE
LEUKOCYTE ESTERASE UR QL STRIP: NEGATIVE
NITRITE UR QL STRIP: NEGATIVE
PH UR STRIP: 7
PROT UR STRIP-MCNC: NEGATIVE
SP GR UR STRIP: 1.02

## 2019-10-03 PROCEDURE — 99214 OFFICE O/P EST MOD 30 MIN: CPT | Mod: 25

## 2019-10-03 PROCEDURE — 36415 COLL VENOUS BLD VENIPUNCTURE: CPT

## 2019-10-03 PROCEDURE — 81003 URINALYSIS AUTO W/O SCOPE: CPT | Mod: NC,QW

## 2019-10-03 NOTE — PHYSICAL EXAM
[No Acute Distress] : no acute distress [Well Nourished] : well nourished [Well Developed] : well developed [Well-Appearing] : well-appearing [Normal Sclera/Conjunctiva] : normal sclera/conjunctiva [PERRL] : pupils equal round and reactive to light [EOMI] : extraocular movements intact [Normal Outer Ear/Nose] : the outer ears and nose were normal in appearance [Normal Oropharynx] : the oropharynx was normal [No JVD] : no jugular venous distention [Supple] : supple [No Lymphadenopathy] : no lymphadenopathy [Thyroid Normal, No Nodules] : the thyroid was normal and there were no nodules present [No Respiratory Distress] : no respiratory distress  [No Accessory Muscle Use] : no accessory muscle use [Normal Rate] : normal rate  [Clear to Auscultation] : lungs were clear to auscultation bilaterally [Regular Rhythm] : with a regular rhythm [Normal S1, S2] : normal S1 and S2 [No Murmur] : no murmur heard [No Carotid Bruits] : no carotid bruits [No Abdominal Bruit] : a ~M bruit was not heard ~T in the abdomen [No Varicosities] : no varicosities [No Edema] : there was no peripheral edema [Pedal Pulses Present] : the pedal pulses are present [No Palpable Aorta] : no palpable aorta [No Extremity Clubbing/Cyanosis] : no extremity clubbing/cyanosis [Soft] : abdomen soft [Non-distended] : non-distended [Non Tender] : non-tender [No HSM] : no HSM [No Masses] : no abdominal mass palpated [Normal Bowel Sounds] : normal bowel sounds [Normal Posterior Cervical Nodes] : no posterior cervical lymphadenopathy [Normal Anterior Cervical Nodes] : no anterior cervical lymphadenopathy [No CVA Tenderness] : no CVA  tenderness [No Spinal Tenderness] : no spinal tenderness [No Joint Swelling] : no joint swelling [Grossly Normal Strength/Tone] : grossly normal strength/tone [No Rash] : no rash [No Focal Deficits] : no focal deficits [Coordination Grossly Intact] : coordination grossly intact [Normal Gait] : normal gait [Deep Tendon Reflexes (DTR)] : deep tendon reflexes were 2+ and symmetric [Normal Affect] : the affect was normal [Normal Insight/Judgement] : insight and judgment were intact [de-identified] : tender in suprapubic area.

## 2019-10-03 NOTE — HISTORY OF PRESENT ILLNESS
[FreeTextEntry1] : Saturday AM had pain in mid lower abdomen. constant. went to . told trace blood, and sonogram was normal. Pain resolve yesterday. Sunday AM awoke in sweat, and and also Monday awoke in sweat in middle of the night. After colonoscopy awoke with Kidney stone and was hospitalized and given pain meds and passed stone on Wednesday

## 2019-10-04 LAB
ALBUMIN SERPL ELPH-MCNC: 4.6 G/DL
ALP BLD-CCNC: 74 U/L
ALT SERPL-CCNC: 32 U/L
ANION GAP SERPL CALC-SCNC: 12 MMOL/L
AST SERPL-CCNC: 18 U/L
BASOPHILS # BLD AUTO: 0.03 K/UL
BASOPHILS NFR BLD AUTO: 0.4 %
BILIRUB SERPL-MCNC: 0.5 MG/DL
BUN SERPL-MCNC: 23 MG/DL
CALCIUM SERPL-MCNC: 9.6 MG/DL
CHLORIDE SERPL-SCNC: 105 MMOL/L
CO2 SERPL-SCNC: 25 MMOL/L
CREAT SERPL-MCNC: 1.06 MG/DL
EOSINOPHIL # BLD AUTO: 0 K/UL
EOSINOPHIL NFR BLD AUTO: 0 %
GLUCOSE SERPL-MCNC: 134 MG/DL
HCT VFR BLD CALC: 47.3 %
HGB BLD-MCNC: 15.2 G/DL
IMM GRANULOCYTES NFR BLD AUTO: 0.5 %
LYMPHOCYTES # BLD AUTO: 1.14 K/UL
LYMPHOCYTES NFR BLD AUTO: 14.2 %
MAN DIFF?: NORMAL
MCHC RBC-ENTMCNC: 28.8 PG
MCHC RBC-ENTMCNC: 32.1 GM/DL
MCV RBC AUTO: 89.8 FL
MONOCYTES # BLD AUTO: 0.21 K/UL
MONOCYTES NFR BLD AUTO: 2.6 %
NEUTROPHILS # BLD AUTO: 6.63 K/UL
NEUTROPHILS NFR BLD AUTO: 82.3 %
PLATELET # BLD AUTO: 181 K/UL
POTASSIUM SERPL-SCNC: 5.1 MMOL/L
PROT SERPL-MCNC: 7.2 G/DL
RBC # BLD: 5.27 M/UL
RBC # FLD: 12.2 %
SODIUM SERPL-SCNC: 142 MMOL/L
URATE SERPL-MCNC: 6.9 MG/DL
WBC # FLD AUTO: 8.05 K/UL

## 2019-11-12 ENCOUNTER — NON-APPOINTMENT (OUTPATIENT)
Age: 64
End: 2019-11-12

## 2019-11-12 ENCOUNTER — APPOINTMENT (OUTPATIENT)
Dept: INTERNAL MEDICINE | Facility: CLINIC | Age: 64
End: 2019-11-12
Payer: COMMERCIAL

## 2019-11-12 VITALS
SYSTOLIC BLOOD PRESSURE: 131 MMHG | BODY MASS INDEX: 31.84 KG/M2 | DIASTOLIC BLOOD PRESSURE: 83 MMHG | HEIGHT: 69 IN | HEART RATE: 71 BPM | WEIGHT: 215 LBS

## 2019-11-12 DIAGNOSIS — I82.401 ACUTE EMBOLISM AND THROMBOSIS OF UNSPECIFIED DEEP VEINS OF RIGHT LOWER EXTREMITY: ICD-10-CM

## 2019-11-12 DIAGNOSIS — R10.9 UNSPECIFIED ABDOMINAL PAIN: ICD-10-CM

## 2019-11-12 DIAGNOSIS — Z87.898 PERSONAL HISTORY OF OTHER SPECIFIED CONDITIONS: ICD-10-CM

## 2019-11-12 DIAGNOSIS — Z01.818 ENCOUNTER FOR OTHER PREPROCEDURAL EXAMINATION: ICD-10-CM

## 2019-11-12 PROCEDURE — 99214 OFFICE O/P EST MOD 30 MIN: CPT | Mod: 25

## 2019-11-12 PROCEDURE — 93000 ELECTROCARDIOGRAM COMPLETE: CPT

## 2019-11-12 PROCEDURE — 36415 COLL VENOUS BLD VENIPUNCTURE: CPT

## 2019-11-12 RX ORDER — SODIUM PICOSULFATE, MAGNESIUM OXIDE, AND ANHYDROUS CITRIC ACID 10; 3.5; 12 MG/160ML; G/160ML; G/160ML
10-3.5-12 MG-GM LIQUID ORAL
Qty: 1 | Refills: 0 | Status: DISCONTINUED | COMMUNITY
Start: 2019-08-19 | End: 2019-11-12

## 2019-11-12 RX ORDER — FEBUXOSTAT 40 MG/1
40 TABLET ORAL DAILY
Refills: 0 | Status: DISCONTINUED | COMMUNITY
End: 2019-11-12

## 2019-11-12 RX ORDER — HYDROCORTISONE 25 MG/G
2.5 CREAM TOPICAL 3 TIMES DAILY
Refills: 0 | Status: DISCONTINUED | COMMUNITY
End: 2019-11-12

## 2019-11-12 RX ORDER — METHYLPREDNISOLONE 4 MG/1
4 TABLET ORAL
Refills: 0 | Status: DISCONTINUED | COMMUNITY
End: 2019-11-12

## 2019-11-12 RX ORDER — PREDNISONE 10 MG/1
10 TABLET ORAL
Refills: 0 | Status: DISCONTINUED | COMMUNITY
End: 2019-11-12

## 2019-11-12 RX ORDER — ASPIRIN 81 MG
81 TABLET, DELAYED RELEASE (ENTERIC COATED) ORAL
Refills: 0 | Status: ACTIVE | COMMUNITY

## 2019-11-12 NOTE — PHYSICAL EXAM
[No Acute Distress] : no acute distress [Normal Outer Ear/Nose] : the outer ears and nose were normal in appearance [No JVD] : no jugular venous distention [No Respiratory Distress] : no respiratory distress  [No Accessory Muscle Use] : no accessory muscle use [Clear to Auscultation] : lungs were clear to auscultation bilaterally [Normal Rate] : normal rate  [Normal Percussion] : the chest was normal to percussion [No Murmur] : no murmur heard [Normal S1, S2] : normal S1 and S2 [Regular Rhythm] : with a regular rhythm [Pedal Pulses Present] : the pedal pulses are present [No Abdominal Bruit] : a ~M bruit was not heard ~T in the abdomen [No Edema] : there was no peripheral edema [Soft] : abdomen soft [Normal Appearance] : normal in appearance [Non Tender] : non-tender [Normal Bowel Sounds] : normal bowel sounds [No HSM] : no HSM [Normal Supraclavicular Nodes] : no supraclavicular lymphadenopathy [Normal Posterior Cervical Nodes] : no posterior cervical lymphadenopathy [Normal Anterior Cervical Nodes] : no anterior cervical lymphadenopathy [No Joint Swelling] : no joint swelling [No Rash] : no rash [Memory Grossly Normal] : memory grossly normal [Speech Grossly Normal] : speech grossly normal [Alert and Oriented x3] : oriented to person, place, and time [Normal Affect] : the affect was normal [Normal Mood] : the mood was normal [Normal Insight/Judgement] : insight and judgment were intact

## 2019-11-13 ENCOUNTER — APPOINTMENT (OUTPATIENT)
Dept: INTERNAL MEDICINE | Facility: CLINIC | Age: 64
End: 2019-11-13

## 2019-11-13 LAB
ALBUMIN SERPL ELPH-MCNC: 4.3 G/DL
ALP BLD-CCNC: 63 U/L
ALT SERPL-CCNC: 24 U/L
ANION GAP SERPL CALC-SCNC: 13 MMOL/L
APPEARANCE: CLEAR
APTT BLD: 29.1 SEC
AST SERPL-CCNC: 18 U/L
BASOPHILS # BLD AUTO: 0.07 K/UL
BASOPHILS NFR BLD AUTO: 0.9 %
BILIRUB SERPL-MCNC: 0.6 MG/DL
BILIRUBIN URINE: NEGATIVE
BLOOD URINE: NEGATIVE
BUN SERPL-MCNC: 22 MG/DL
CALCIUM SERPL-MCNC: 9.1 MG/DL
CHLORIDE SERPL-SCNC: 105 MMOL/L
CO2 SERPL-SCNC: 23 MMOL/L
COLOR: NORMAL
CREAT SERPL-MCNC: 1.11 MG/DL
EOSINOPHIL # BLD AUTO: 0.15 K/UL
EOSINOPHIL NFR BLD AUTO: 1.9 %
GLUCOSE QUALITATIVE U: NEGATIVE
GLUCOSE SERPL-MCNC: 98 MG/DL
HCT VFR BLD CALC: 47.7 %
HGB BLD-MCNC: 15.4 G/DL
IMM GRANULOCYTES NFR BLD AUTO: 0.5 %
INR PPP: 0.93 RATIO
KETONES URINE: NEGATIVE
LEUKOCYTE ESTERASE URINE: NEGATIVE
LYMPHOCYTES # BLD AUTO: 2.68 K/UL
LYMPHOCYTES NFR BLD AUTO: 33.1 %
MAN DIFF?: NORMAL
MCHC RBC-ENTMCNC: 28.6 PG
MCHC RBC-ENTMCNC: 32.3 GM/DL
MCV RBC AUTO: 88.7 FL
MONOCYTES # BLD AUTO: 0.5 K/UL
MONOCYTES NFR BLD AUTO: 6.2 %
NEUTROPHILS # BLD AUTO: 4.65 K/UL
NEUTROPHILS NFR BLD AUTO: 57.4 %
NITRITE URINE: NEGATIVE
PH URINE: 5
PLATELET # BLD AUTO: 155 K/UL
POTASSIUM SERPL-SCNC: 4.5 MMOL/L
PROT SERPL-MCNC: 6.9 G/DL
PROTEIN URINE: NEGATIVE
PT BLD: 10.5 SEC
RBC # BLD: 5.38 M/UL
RBC # FLD: 12.7 %
SODIUM SERPL-SCNC: 141 MMOL/L
SPECIFIC GRAVITY URINE: 1.02
UROBILINOGEN URINE: NORMAL
WBC # FLD AUTO: 8.09 K/UL

## 2019-11-13 NOTE — PLAN
[FreeTextEntry1] : The patient is cleared for surgery from a cardiac and general medical point of view.

## 2019-11-13 NOTE — CONSULT LETTER
[Dear  ___] : Dear  [unfilled], [Please see my note below.] : Please see my note below. [Consult Letter:] : I had the pleasure of evaluating your patient, [unfilled]. [Consult Closing:] : Thank you very much for allowing me to participate in the care of this patient.  If you have any questions, please do not hesitate to contact me. [Sincerely,] : Sincerely, [FreeTextEntry3] : Radhika Dia MD

## 2019-11-13 NOTE — HISTORY OF PRESENT ILLNESS
[Coronary Artery Disease] : coronary artery disease [(Patient denies any chest pain, claudication, dyspnea on exertion, orthopnea, palpitations or syncope)] : Patient denies any chest pain, claudication, dyspnea on exertion, orthopnea, palpitations or syncope [Excellent (>10 METs)] : Excellent (>10 METs) [Anti-Platelet Agents: _____] : Anti-Platelet Agents: [unfilled] [Aortic Stenosis] : no aortic stenosis [Atrial Fibrillation] : no atrial fibrillation [Recent Myocardial Infarction] : no recent myocardial infarction [Implantable Device/Pacemaker] : no implantable device/pacemaker [FreeTextEntry1] : lithotripsy [FreeTextEntry2] : 11/15/2019 [FreeTextEntry3] : Ricardo Cherry MD [FreeTextEntry7] : CT angio of coronaries showed mild lesion in the proximal LAD and LCx, estimated at 30-49%.  Patient has no symptoms of chest pain or p[ressure, dyspnea at rest or exertion, palpitations\par He had a saddle pulmonary embolus with right lower extremity DVT in 2015 after a trip to [place]. He was put on lovenox and then had an IVC filter which was removed after 1-2 months. No symptoms since. Workup for hypercoagulability was negative.  [FreeTextEntry4] : Had lithotripsy done about 25 years ago. Has several large ureteral stones on the right that are too large to pass.

## 2019-11-13 NOTE — RESULTS/DATA
[de-identified] : normal [] : results reviewed [de-identified] : normal [de-identified] : normal [de-identified] : RSR WNL

## 2019-11-14 LAB — BACTERIA UR CULT: NORMAL

## 2020-05-21 ENCOUNTER — TRANSCRIPTION ENCOUNTER (OUTPATIENT)
Age: 65
End: 2020-05-21

## 2020-08-13 ENCOUNTER — RX RENEWAL (OUTPATIENT)
Age: 65
End: 2020-08-13

## 2020-08-20 ENCOUNTER — APPOINTMENT (OUTPATIENT)
Dept: INTERNAL MEDICINE | Facility: CLINIC | Age: 65
End: 2020-08-20
Payer: COMMERCIAL

## 2020-08-20 ENCOUNTER — NON-APPOINTMENT (OUTPATIENT)
Age: 65
End: 2020-08-20

## 2020-08-20 ENCOUNTER — APPOINTMENT (OUTPATIENT)
Dept: CARDIOLOGY | Facility: CLINIC | Age: 65
End: 2020-08-20
Payer: COMMERCIAL

## 2020-08-20 VITALS
SYSTOLIC BLOOD PRESSURE: 137 MMHG | HEIGHT: 69 IN | BODY MASS INDEX: 32.58 KG/M2 | WEIGHT: 220 LBS | DIASTOLIC BLOOD PRESSURE: 82 MMHG | HEART RATE: 69 BPM

## 2020-08-20 VITALS — SYSTOLIC BLOOD PRESSURE: 134 MMHG | DIASTOLIC BLOOD PRESSURE: 81 MMHG | HEART RATE: 61 BPM

## 2020-08-20 DIAGNOSIS — M54.30 SCIATICA, UNSPECIFIED SIDE: ICD-10-CM

## 2020-08-20 DIAGNOSIS — Z12.11 ENCOUNTER FOR SCREENING FOR MALIGNANT NEOPLASM OF COLON: ICD-10-CM

## 2020-08-20 DIAGNOSIS — Z12.12 ENCOUNTER FOR SCREENING FOR MALIGNANT NEOPLASM OF COLON: ICD-10-CM

## 2020-08-20 DIAGNOSIS — N23 UNSPECIFIED RENAL COLIC: ICD-10-CM

## 2020-08-20 LAB
BILIRUB UR QL STRIP: NEGATIVE
CLARITY UR: CLEAR
GLUCOSE UR-MCNC: NEGATIVE
HCG UR QL: 0.2 EU/DL
HGB UR QL STRIP.AUTO: NEGATIVE
KETONES UR-MCNC: NEGATIVE
LEUKOCYTE ESTERASE UR QL STRIP: NEGATIVE
NITRITE UR QL STRIP: NEGATIVE
PH UR STRIP: 5.5
PROT UR STRIP-MCNC: NEGATIVE
SP GR UR STRIP: 1.03

## 2020-08-20 PROCEDURE — 99397 PER PM REEVAL EST PAT 65+ YR: CPT | Mod: 25

## 2020-08-20 PROCEDURE — 99213 OFFICE O/P EST LOW 20 MIN: CPT

## 2020-08-20 PROCEDURE — 90670 PCV13 VACCINE IM: CPT

## 2020-08-20 PROCEDURE — 36415 COLL VENOUS BLD VENIPUNCTURE: CPT

## 2020-08-20 PROCEDURE — 81003 URINALYSIS AUTO W/O SCOPE: CPT | Mod: NC,QW

## 2020-08-20 PROCEDURE — G0009: CPT

## 2020-08-20 PROCEDURE — G0447 BEHAVIOR COUNSEL OBESITY 15M: CPT

## 2020-08-20 PROCEDURE — G0444 DEPRESSION SCREEN ANNUAL: CPT

## 2020-08-20 PROCEDURE — 93000 ELECTROCARDIOGRAM COMPLETE: CPT

## 2020-08-20 PROCEDURE — G0442 ANNUAL ALCOHOL SCREEN 15 MIN: CPT

## 2020-08-20 RX ORDER — UBIDECARENONE/VIT E ACET 100MG-5
CAPSULE ORAL
Refills: 0 | Status: DISCONTINUED | COMMUNITY
End: 2020-08-20

## 2020-08-20 RX ORDER — OMEGA-3/DHA/EPA/FISH OIL 300-1000MG
CAPSULE ORAL
Refills: 0 | Status: DISCONTINUED | COMMUNITY
End: 2020-08-20

## 2020-08-20 RX ORDER — MULTIVITAMIN
TABLET ORAL
Refills: 0 | Status: DISCONTINUED | COMMUNITY
End: 2020-08-20

## 2020-08-20 RX ORDER — CHERRY FLAVOR
LIQUID (ML) ORAL
Refills: 0 | Status: DISCONTINUED | COMMUNITY
End: 2020-08-20

## 2020-08-20 NOTE — DISCUSSION/SUMMARY
[FreeTextEntry1] : 64M with nonobstructive CAD on CTA, hx of DVT/PE\par \par 1. CAD:  Mild, nonobstructive. Asymptomatic. \par \par -Cont asa 81mg, crestor 10mg\par -Trend lipids, CMP\par \par 2. DVT/PE:  Seemingly provoked from long plane flight. Father had a DVT.  Has chosen to dc AC without any subsequent issues.  \par \par -Cont asa 81mg\par \par 3. LAE: Serial echocardiograms\par \par RV 6 months \par \par

## 2020-08-20 NOTE — PHYSICAL EXAM
[General Appearance - Well Developed] : well developed [Normal Appearance] : normal appearance [Well Groomed] : well groomed [General Appearance - Well Nourished] : well nourished [No Deformities] : no deformities [General Appearance - In No Acute Distress] : no acute distress [Normal Conjunctiva] : the conjunctiva exhibited no abnormalities [Eyelids - No Xanthelasma] : the eyelids demonstrated no xanthelasmas [Normal Oral Mucosa] : normal oral mucosa [No Oral Pallor] : no oral pallor [No Oral Cyanosis] : no oral cyanosis [Normal Jugular Venous A Waves Present] : normal jugular venous A waves present [Normal Jugular Venous V Waves Present] : normal jugular venous V waves present [No Jugular Venous Diaz A Waves] : no jugular venous diaz A waves [Respiration, Rhythm And Depth] : normal respiratory rhythm and effort [Exaggerated Use Of Accessory Muscles For Inspiration] : no accessory muscle use [Auscultation Breath Sounds / Voice Sounds] : lungs were clear to auscultation bilaterally [Heart Rate And Rhythm] : heart rate and rhythm were normal [Heart Sounds] : normal S1 and S2 [Murmurs] : no murmurs present [Abdomen Soft] : soft [Edema] : no peripheral edema present [Abdomen Tenderness] : non-tender [Gait - Sufficient For Exercise Testing] : the gait was sufficient for exercise testing [Abnormal Walk] : normal gait [Abdomen Mass (___ Cm)] : no abdominal mass palpated [Nail Clubbing] : no clubbing of the fingernails [Petechial Hemorrhages (___cm)] : no petechial hemorrhages [Cyanosis, Localized] : no localized cyanosis [Skin Color & Pigmentation] : normal skin color and pigmentation [No Venous Stasis] : no venous stasis [Skin Lesions] : no skin lesions [] : no rash [No Skin Ulcers] : no skin ulcer [Oriented To Time, Place, And Person] : oriented to person, place, and time [No Xanthoma] : no  xanthoma was observed [Mood] : the mood was normal [Affect] : the affect was normal [No Anxiety] : not feeling anxious

## 2020-08-20 NOTE — COUNSELING
[Potential consequences of obesity discussed] : Potential consequences of obesity discussed [Encouraged to use exercise tracking device] : Encouraged to use exercise tracking device [Benefits of weight loss discussed] : Benefits of weight loss discussed [Good understanding] : Patient has a good understanding of disease, goals and obesity follow-up plan

## 2020-08-20 NOTE — HISTORY OF PRESENT ILLNESS
[FreeTextEntry1] : s/p lithotripsy Never went back for f/u. H/o DVT and PE 4 years ago\par  otherwise feels well. Has some mild lower extermity edema. Rare gout attacks

## 2020-08-20 NOTE — REVIEW OF SYSTEMS
[Negative] : Psychiatric [Fever] : no fever [Chills] : no chills [Shortness Of Breath] : no shortness of breath [Chest Pain] : no chest pain [Palpitations] : no palpitations

## 2020-08-20 NOTE — HEALTH RISK ASSESSMENT
[Very Good] : ~his/her~  mood as very good [No] : In the past 12 months have you used drugs other than those required for medical reasons? No [No falls in past year] : Patient reported no falls in the past year [0] : 2) Feeling down, depressed, or hopeless: Not at all (0) [No Retinopathy] : No retinopathy [Patient reported colonoscopy was normal] : Patient reported colonoscopy was normal [None] : None [With Significant Other] : lives with significant other [Employed] : employed [] :  [# Of Children ___] : has [unfilled] children [Sexually Active] : sexually active [Feels Safe at Home] : Feels safe at home [Fully functional (using the telephone, shopping, preparing meals, housekeeping, doing laundry, using] : Fully functional and needs no help or supervision to perform IADLs (using the telephone, shopping, preparing meals, housekeeping, doing laundry, using transportation, managing medications and managing finances) [Fully functional (bathing, dressing, toileting, transferring, walking, feeding)] : Fully functional (bathing, dressing, toileting, transferring, walking, feeding) [Smoke Detector] : smoke detector [Carbon Monoxide Detector] : carbon monoxide detector [Seat Belt] :  uses seat belt [Sunscreen] : uses sunscreen [] : No [Audit-CScore] : 0 [[id number]] : 0 [de-identified] : no [EyeExamDate] : 2019 [Change in mental status noted] : No change in mental status noted [High Risk Behavior] : no high risk behavior [Reports changes in hearing] : Reports no changes in hearing [Reports changes in dental health] : Reports no changes in dental health [Reports changes in vision] : Reports no changes in vision [ColonoscopyDate] : 9/2019 [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

## 2020-08-20 NOTE — HISTORY OF PRESENT ILLNESS
[FreeTextEntry1] : 64M remote hx of DVT/PE, mild CAD who presents for follow up\par \par Started on lipitor for mild CAD on CTA. \par Occasional muscle aches, otherwise denies CP, SOB, dizziness, lightheadedness, syncope. \par \par HISTORY:\par \par Early 2019- Patient had an episode of dyspnea while skiing in very cold weather. Underwent exercise stress test which was negative for ischemia but was noted with ventricular bigeminy during recovery. He subsequently underwent cardiac CTA which showed mild diffuse CAD. Currently feels well without chest pain, shortness of breath, palpitations, dizziness, syncope. He is pending colonoscopy.  Can walk up 2 flights of stairs without difficulty. \par \par In 2015, he developed a DVT and subsequent saddle PE after a flight to [place]. Had IVC filter placed and was put on Xarelto. Subsequently was taken off blood thinners and had filter removed. Thrombophilia workup was negative. Cancer screening at that time was negative. He is maintained on daily aspirin.\par \par Denies family hx of heart disease. Father had DVT. Owns a warehouse. Nonsmoker.  Works as  during the winter\par \par ECG: SR, no ST-T wave changes\par EST 8/2019: 7:00 mins, no ischemia, Ventricular bigeminy during recovery\par TTE 2016: Normal LV function, mild LAE\par \par Cardiac CTA: CAC score 13\par LM <30%\par pLAD 30-49%\par mLAD <30%\par D1 <30%\par RI <30%\par LCx 30-49%\par RCA: Normal\par \par Asa 81mg, atorvastatin 10mg

## 2020-08-27 DIAGNOSIS — D72.829 ELEVATED WHITE BLOOD CELL COUNT, UNSPECIFIED: ICD-10-CM

## 2020-08-27 LAB
25(OH)D3 SERPL-MCNC: 28.8 NG/ML
ALBUMIN SERPL ELPH-MCNC: 4.7 G/DL
ALP BLD-CCNC: 61 U/L
ALT SERPL-CCNC: 31 U/L
ANION GAP SERPL CALC-SCNC: 15 MMOL/L
AST SERPL-CCNC: 24 U/L
BASOPHILS # BLD AUTO: 0.06 K/UL
BASOPHILS NFR BLD AUTO: 0.5 %
BILIRUB SERPL-MCNC: 0.9 MG/DL
BUN SERPL-MCNC: 20 MG/DL
CALCIUM SERPL-MCNC: 9.3 MG/DL
CHLORIDE SERPL-SCNC: 105 MMOL/L
CHOLEST SERPL-MCNC: 148 MG/DL
CHOLEST/HDLC SERPL: 3.5 RATIO
CO2 SERPL-SCNC: 22 MMOL/L
CREAT SERPL-MCNC: 1.07 MG/DL
EOSINOPHIL # BLD AUTO: 0.09 K/UL
EOSINOPHIL NFR BLD AUTO: 0.8 %
ESTIMATED AVERAGE GLUCOSE: 120 MG/DL
GLUCOSE SERPL-MCNC: 89 MG/DL
HBA1C MFR BLD HPLC: 5.8 %
HCT VFR BLD CALC: 52.5 %
HDLC SERPL-MCNC: 43 MG/DL
HGB BLD-MCNC: 16.6 G/DL
IMM GRANULOCYTES NFR BLD AUTO: 0.4 %
LDLC SERPL CALC-MCNC: 74 MG/DL
LYMPHOCYTES # BLD AUTO: 2.17 K/UL
LYMPHOCYTES NFR BLD AUTO: 18.5 %
MAN DIFF?: NORMAL
MCHC RBC-ENTMCNC: 29.1 PG
MCHC RBC-ENTMCNC: 31.6 GM/DL
MCV RBC AUTO: 92.1 FL
MONOCYTES # BLD AUTO: 0.69 K/UL
MONOCYTES NFR BLD AUTO: 5.9 %
NEUTROPHILS # BLD AUTO: 8.65 K/UL
NEUTROPHILS NFR BLD AUTO: 73.9 %
PLATELET # BLD AUTO: 139 K/UL
POTASSIUM SERPL-SCNC: 5.6 MMOL/L
PROT SERPL-MCNC: 7 G/DL
PSA SERPL-MCNC: 1.13 NG/ML
RBC # BLD: 5.7 M/UL
RBC # FLD: 12.5 %
SARS-COV-2 IGG SERPL IA-ACNC: 0.09 INDEX
SARS-COV-2 IGG SERPL QL IA: NEGATIVE
SODIUM SERPL-SCNC: 142 MMOL/L
T4 FREE SERPL-MCNC: 1.2 NG/DL
T4 SERPL-MCNC: 6.9 UG/DL
TRIGL SERPL-MCNC: 153 MG/DL
TSH SERPL-ACNC: 1.23 UIU/ML
VIT B12 SERPL-MCNC: 670 PG/ML
WBC # FLD AUTO: 11.71 K/UL

## 2020-08-31 LAB
BASOPHILS # BLD AUTO: 0.08 K/UL
BASOPHILS NFR BLD AUTO: 1 %
EOSINOPHIL # BLD AUTO: 0.17 K/UL
EOSINOPHIL NFR BLD AUTO: 2.2 %
HCT VFR BLD CALC: 48.8 %
HGB BLD-MCNC: 16.2 G/DL
IMM GRANULOCYTES NFR BLD AUTO: 0.4 %
LYMPHOCYTES # BLD AUTO: 2.79 K/UL
LYMPHOCYTES NFR BLD AUTO: 36.5 %
MAN DIFF?: NORMAL
MCHC RBC-ENTMCNC: 29.9 PG
MCHC RBC-ENTMCNC: 33.2 GM/DL
MCV RBC AUTO: 90 FL
MONOCYTES # BLD AUTO: 0.46 K/UL
MONOCYTES NFR BLD AUTO: 6 %
NEUTROPHILS # BLD AUTO: 4.12 K/UL
NEUTROPHILS NFR BLD AUTO: 53.9 %
PLATELET # BLD AUTO: 131 K/UL
RBC # BLD: 5.42 M/UL
RBC # FLD: 12.6 %
WBC # FLD AUTO: 7.65 K/UL

## 2020-11-25 ENCOUNTER — TRANSCRIPTION ENCOUNTER (OUTPATIENT)
Age: 65
End: 2020-11-25

## 2020-12-10 ENCOUNTER — APPOINTMENT (OUTPATIENT)
Dept: ORTHOPEDIC SURGERY | Facility: CLINIC | Age: 65
End: 2020-12-10
Payer: COMMERCIAL

## 2020-12-10 VITALS
DIASTOLIC BLOOD PRESSURE: 79 MMHG | SYSTOLIC BLOOD PRESSURE: 146 MMHG | WEIGHT: 215 LBS | BODY MASS INDEX: 31.84 KG/M2 | TEMPERATURE: 97.5 F | HEART RATE: 80 BPM | HEIGHT: 69 IN

## 2020-12-10 DIAGNOSIS — M75.42 IMPINGEMENT SYNDROME OF LEFT SHOULDER: ICD-10-CM

## 2020-12-10 DIAGNOSIS — Z87.39 PERSONAL HISTORY OF OTHER DISEASES OF THE MUSCULOSKELETAL SYSTEM AND CONNECTIVE TISSUE: ICD-10-CM

## 2020-12-10 DIAGNOSIS — Z80.9 FAMILY HISTORY OF MALIGNANT NEOPLASM, UNSPECIFIED: ICD-10-CM

## 2020-12-10 DIAGNOSIS — M75.41 IMPINGEMENT SYNDROME OF RIGHT SHOULDER: ICD-10-CM

## 2020-12-10 PROCEDURE — 73562 X-RAY EXAM OF KNEE 3: CPT | Mod: RT

## 2020-12-10 PROCEDURE — 99204 OFFICE O/P NEW MOD 45 MIN: CPT | Mod: 25

## 2020-12-10 PROCEDURE — 20610 DRAIN/INJ JOINT/BURSA W/O US: CPT | Mod: LT

## 2020-12-10 PROCEDURE — 99072 ADDL SUPL MATRL&STAF TM PHE: CPT

## 2020-12-10 NOTE — PHYSICAL EXAM
[de-identified] : Constitutional: Well-nourished, well-developed, No acute distress\par Respiratory:  Good respiratory effort, no SOB\par Lymphatic: No regional lymphadenopathy, no lymphedema\par Psychiatric: Pleasant and normal affect, alert and oriented x3\par Skin: Clean dry and intact B/L UE/LE\par Musculoskeletal: normal except where as noted in regional exam\par \par B/L Hips: No asymmetry, malalignment, or swelling, Full ROM, 5/5 strength in flexion/ext, IR/ER, Abd/Add, Joints stable\par B/L Ankles: No asymmetry, malalignment, or swelling, Full ROM, 5/5 strength in DF/PF/Inv/Ev, Joints stable\par \par BIOMECHANICAL EXAM: no marked leg length discrepancy, moderate hip abductor weakness b/l, no marked pes planus or foot pronation, tight hams and ITB b/l.  Normal gait and station\par \par Left Knee:\par APPEARANCE: no marked deformities, no swelling or malalignment\par POSITIVE TENDERNESS:  + crepitus of the anterior knee, and tenderness of patellar retinaculum\par NONTENDER: jt lines b/l, patellar & quadriceps tendons, MCL/LCL, ITB at the lateral femoral condyle & Gerdy's tubercle, pes bursa. \par ROM: full Extension, flexion limited to 100 degrees due to stiffness and pain \par RESISTIVE TESTING: + discomfort with knee ext from deep knee flexion (stretched position), painless knee flexion. \par SPECIAL TESTS: stable v/v stress. painless grind. neg Lachman's. neg ant/post drawer. neg Jovany's. neg Thessaly test. neg Ellyn's & Malacrae's\par NEURO: Normal sensation of LE, DTRs 2+/4 patella and achilles\par PULSES: 2+ DP/PT pulses\par \par Right Knee:\par APPEARANCE: no marked deformities, no swelling or malalignment\par POSITIVE TENDERNESS:  + crepitus of the anterior knee, and tenderness of patellar retinaculum\par NONTENDER: jt lines b/l, patellar & quadriceps tendons, MCL/LCL, ITB at the lateral femoral condyle & Gerdy's tubercle, pes bursa. \par ROM: full Extension, but flexion limited to 120 degrees due to stiffness and pain \par RESISTIVE TESTING: + discomfort with knee ext from deep knee flexion (stretched position), painless knee flexion. \par SPECIAL TESTS: stable v/v stress. painless grind. neg Lachman's. neg ant/post drawer. neg Jovany's. neg Thessaly test. neg Ellyn's & Malacrae's\par NEURO: Normal sensation of LE, DTRs 2+/4 patella and achilles\par PULSES: 2+ DP/PT pulses\par \par Left shoulder:\par APPEARANCE: no marked deformities, no swelling or malalignment\par POSITIVE TENDERNESS: supraspinatus\par NONTENDER:  infraspinatus, teres minor. biceps. anterior and posterior capsule. AC joint. \par ROM: full with mild painful arc past 60 degrees, no scapular winging or dyskinesia present\par RESISTIVE TESTING: MMT 4+/5 ER and empty can, 5/5 IR. painless 5/5 resisted flex/ext, horizontal abd/add \par SPECIAL TESTS: + Harden and Neers, mildly + cross arm adduction, neg Speeds, neg Sherwood's, neg Drop Arm, neg Apprehension. neg apley's scratch test\par  [de-identified] : The following radiographs were ordered and read by me during this patient's visit. I reviewed each radiograph in detail with the patient and discussed the findings as highlighted below. \par \par 3 views of the b/l knees were obtained today that show degenerative changes and evidence of mild to moderate tricompartmental osteoarthritis.  No fracture, or dislocation seen at this time. There is no malalignment. No other obvious osseous abnormality. Otherwise unremarkable.\par \par

## 2020-12-10 NOTE — HISTORY OF PRESENT ILLNESS
[de-identified] : Patient is here for b/l knee pain, L>R, that began about a week ago. He was doing work around his house and was on his knees for about 3 days. He was using a support pad. he had difficulty walking and bending his knee. He used ice, heat, and Tylenol to treat it. He has noticed mild improvement. He had an arthroscopic surgery performed on the left knee about 8 years ago. Denies N/T/R\par Patient also complains of left shoulder pain for about three months. No particular injury. He has increased pain with ADLs. Pain is an intermittent shapr pain over the lateral portion of the shoulder/upper arm.   Denies N/T/R/Prior injury. \par \par The patient's past medical history, past surgical history, medications and allergies were reviewed by me today and documented accordingly. In addition, the patient's family and social history, which were noncontributory to this visit, were reviewed also. Intake form was reviewed. The patient has no family history of arthritis.

## 2020-12-10 NOTE — DISCUSSION/SUMMARY
[de-identified] : Discussed findings of today's exam and possible causes of patient's pain. Educated patient on their most probable diagnosis of mild to moderate bilateral knee osteoarthritis, left knee symptomatic today.  Reviewed possible courses of treatment, and we collaboratively decided best course of treatment at this time will include cortisone injection today to the left knee (see procedure note). Informed the patient that the numbing medicine in today's injection will last for about 4-6 hours. The steroid that was injected will start to work in 1 to 2 days, peak at 1-2 weeks, and may last up to 1-2 months.  Patient will be started on a course of physical therapy to restore normal range of motion and strength as tolerated.  Will submit authorization for approval for HA injections for long term preventative measures. \par \par Patient was also evaluated for left shoulder pain.  Educated patient on their most probable diagnosis of left shoulder impingement. Reviewed possible courses of treatment, and we collaboratively decided best course of treatment at this time will include starting  a course of physical therapy to restore normal range of motion and strength as tolerated. Patient may take oral NSAIDs as needed. \par Follow up as needed. Patient appreciates and agrees with current plan.\par \par This note was generated using dragon medical dictation software. A reasonable effort has been made for proofreading its contents, but typos may still remain. If there are any questions or points of clarification needed please notify my office. \par  \par \par

## 2020-12-10 NOTE — PROCEDURE
[de-identified] : Injection: Left knee joint.\par Indication: Osteoarthritis.\par \par A discussion was had with the patient regarding this procedure and all questions were answered. All risks, benefits and alternatives were discussed. These included but were not limited to bleeding, infection, allergic reaction and reaccumulation of fluid. A timeout was done to ensure correct side and pt agreed to the procedure.  Alcohol was used to clean the skin, and betadine was used to sterilize and prep the area in the lateral joint line aspect of the knee. Ethyl chloride spray was then used as a topical anesthetic. A 22-gauge needle was used to inject 2cc of 0.25% bupivacaine and 1cc of 40mg/ml methylprednisolone into the knee. A sterile bandage was then applied. The patient tolerated the procedure well.

## 2020-12-10 NOTE — REASON FOR VISIT
[Initial Visit] : an initial visit for [Spouse] : spouse [FreeTextEntry2] : b/l knee/right shoulder pain

## 2020-12-23 PROBLEM — Z12.11 ENCOUNTER FOR COLORECTAL CANCER SCREENING: Status: RESOLVED | Noted: 2019-08-19 | Resolved: 2020-12-23

## 2020-12-25 ENCOUNTER — TRANSCRIPTION ENCOUNTER (OUTPATIENT)
Age: 65
End: 2020-12-25

## 2021-01-15 ENCOUNTER — APPOINTMENT (OUTPATIENT)
Dept: ORTHOPEDIC SURGERY | Facility: CLINIC | Age: 66
End: 2021-01-15
Payer: COMMERCIAL

## 2021-01-15 PROCEDURE — 20610 DRAIN/INJ JOINT/BURSA W/O US: CPT | Mod: NC

## 2021-01-15 PROCEDURE — 99213 OFFICE O/P EST LOW 20 MIN: CPT | Mod: 25

## 2021-01-15 PROCEDURE — 99072 ADDL SUPL MATRL&STAF TM PHE: CPT

## 2021-01-15 NOTE — DISCUSSION/SUMMARY
[de-identified] : The first of three Visco 3 injections was given today under sterile conditions into the Bilateral knee joints without complication (see procedure note). The patient was instructed on modification of activities over the next 48-72 hours. I advised the patient to ice the knee as needed for control of local irritation from the injection. I advised that some patients have immediate benefit from the initial injection therapy, however it usually takes the medication a number of weeks (~8wks) to provide significant relief of osteoarthritic symptoms. \par \par I  will see the patient back for the second injection in approximately 1 week.\par \par This note was generated using dragon medical dictation software. A reasonable effort has been made for proofreading its contents, but typos may still remain. If there are any questions or points of clarification needed please notify my office. \par \par \par

## 2021-01-15 NOTE — PHYSICAL EXAM
[de-identified] : Constitutional: Well-nourished, well-developed, No acute distress\par Respiratory: Good respiratory effort, no SOB\par Lymphatic: No regional lymphadenopathy, no lymphedema\par Psychiatric: Pleasant and normal affect, alert and oriented x3\par Skin: Clean dry and intact B/L UE/LE\par Musculoskeletal: normal except where as noted in regional exam\par \par Left Knee:\par APPEARANCE: no marked deformities, no swelling or malalignment\par POSITIVE TENDERNESS: + crepitus of the anterior knee, and tenderness of patellar retinaculum\par NONTENDER: jt lines b/l, patellar & quadriceps tendons, MCL/LCL, ITB at the lateral femoral condyle & Gerdy's tubercle, pes bursa. \par ROM: full Extension, flexion limited to 100 degrees due to stiffness and pain \par RESISTIVE TESTING: + discomfort with knee ext from deep knee flexion (stretched position), painless knee flexion. \par SPECIAL TESTS: stable v/v stress. painless grind. neg Lachman's. neg ant/post drawer. neg Jovany's. neg Thessaly test. neg Ellyn's & Malacrae's\par NEURO: Normal sensation of LE, DTRs 2+/4 patella and achilles\par PULSES: 2+ DP/PT pulses\par \par Right Knee:\par APPEARANCE: no marked deformities, no swelling or malalignment\par POSITIVE TENDERNESS: + crepitus of the anterior knee, and tenderness of patellar retinaculum\par NONTENDER: jt lines b/l, patellar & quadriceps tendons, MCL/LCL, ITB at the lateral femoral condyle & Gerdy's tubercle, pes bursa. \par ROM: full Extension, but flexion limited to 120 degrees due to stiffness and pain \par RESISTIVE TESTING: + discomfort with knee ext from deep knee flexion (stretched position), painless knee flexion. \par SPECIAL TESTS: stable v/v stress. painless grind. neg Lachman's. neg ant/post drawer. neg Jovany's. neg Thessaly test. neg Ellyn's & Malacrae's\par NEURO: Normal sensation of LE, DTRs 2+/4 patella and achilles\par PULSES: 2+ DP/PT pulses\par

## 2021-01-15 NOTE — PROCEDURE
[de-identified] : Injection: Right knee joint.\par Indication: Osteoarthritis.\par \par A discussion was had with the patient regarding this procedure and all questions were answered. All risks, benefits and alternatives were discussed. These included but were not limited to bleeding, infection, and allergic reaction. Alcohol was used to clean the skin, and betadine was used to sterilize and prep the area in the lateral joint line aspect of the knee. Ethyl chloride spray was then used as a topical anesthetic. A 22-gauge needle was used to inject 2 cc of Visco 3 into the knee with ease. A sterile bandage was then applied. The patient tolerated the procedure well and there were no complications. \par \par Lot #:  [id number]\par Exp:  12/31/23\par \par _____________________________________\par \par Injection: Left knee joint.\par Indication: Osteoarthritis.\par \par A discussion was had with the patient regarding this procedure and all questions were answered. All risks, benefits and alternatives were discussed. These included but were not limited to bleeding, infection, and allergic reaction. Alcohol was used to clean the skin, and betadine was used to sterilize and prep the area in the lateral joint line aspect of the knee. Ethyl chloride spray was then used as a topical anesthetic. A 22-gauge needle was used to inject 2 cc of Visco 3 into the knee with ease. A sterile bandage was then applied. The patient tolerated the procedure well and there were no complications. \par \par Lot #:  Same as above\par Exp:  Same as above\par \par

## 2021-01-15 NOTE — HISTORY OF PRESENT ILLNESS
[de-identified] : Patient has a history of knee osteoarthritis.  We discussed treatment options and patient would like  to proceed with a series of hyaluronic acid injections at this time.  No significant interval change in knee pain since last evaluated.

## 2021-01-22 ENCOUNTER — APPOINTMENT (OUTPATIENT)
Dept: ORTHOPEDIC SURGERY | Facility: CLINIC | Age: 66
End: 2021-01-22
Payer: SELF-PAY

## 2021-01-22 VITALS — HEIGHT: 69 IN | WEIGHT: 215 LBS | BODY MASS INDEX: 31.84 KG/M2

## 2021-01-22 DIAGNOSIS — M17.0 BILATERAL PRIMARY OSTEOARTHRITIS OF KNEE: ICD-10-CM

## 2021-01-22 PROCEDURE — 20610 DRAIN/INJ JOINT/BURSA W/O US: CPT | Mod: NC

## 2021-01-22 NOTE — PROCEDURE
[de-identified] : Injection: Right knee joint.\par Indication: Osteoarthritis.\par \par A discussion was had with the patient regarding this procedure and all questions were answered. All risks, benefits and alternatives were discussed. These included but were not limited to bleeding, infection, and allergic reaction. Alcohol was used to clean the skin, and betadine was used to sterilize and prep the area in the lateral joint line aspect of the knee. Ethyl chloride spray was then used as a topical anesthetic. A 22-gauge needle was used to inject 4 cc of Visco 3 into the knee with ease. A sterile bandage was then applied. The patient tolerated the procedure well and there were no complications. \par \par Lot #:  [id number]\par Exp:  12/31/23\par \par _____________________________________\par \par Injection: Left knee joint.\par Indication: Osteoarthritis.\par \par A discussion was had with the patient regarding this procedure and all questions were answered. All risks, benefits and alternatives were discussed. These included but were not limited to bleeding, infection, and allergic reaction. Alcohol was used to clean the skin, and betadine was used to sterilize and prep the area in the lateral joint line aspect of the knee. Ethyl chloride spray was then used as a topical anesthetic. A 22-gauge needle was used to inject 4 cc of Visco 3 into the knee with ease. A sterile bandage was then applied. The patient tolerated the procedure well and there were no complications. \par \par Lot #:  Same as above\par Exp:  Same as above\par \par

## 2021-01-22 NOTE — PHYSICAL EXAM
[de-identified] : Constitutional: Well-nourished, well-developed, No acute distress\par Respiratory: Good respiratory effort, no SOB\par Lymphatic: No regional lymphadenopathy, no lymphedema\par Psychiatric: Pleasant and normal affect, alert and oriented x3\par Skin: Clean dry and intact B/L UE/LE\par Musculoskeletal: normal except where as noted in regional exam\par \par Left Knee:\par APPEARANCE: no marked deformities, no swelling or malalignment\par POSITIVE TENDERNESS: + crepitus of the anterior knee, and tenderness of patellar retinaculum\par NONTENDER: jt lines b/l, patellar & quadriceps tendons, MCL/LCL, ITB at the lateral femoral condyle & Gerdy's tubercle, pes bursa. \par ROM: full Extension, flexion limited to 100 degrees due to stiffness and pain \par RESISTIVE TESTING: + discomfort with knee ext from deep knee flexion (stretched position), painless knee flexion. \par SPECIAL TESTS: stable v/v stress. painless grind. neg Lachman's. neg ant/post drawer. neg Jovany's. neg Thessaly test. neg Ellyn's & Malacrae's\par NEURO: Normal sensation of LE, DTRs 2+/4 patella and achilles\par PULSES: 2+ DP/PT pulses\par \par Right Knee:\par APPEARANCE: no marked deformities, no swelling or malalignment\par POSITIVE TENDERNESS: + crepitus of the anterior knee, and tenderness of patellar retinaculum\par NONTENDER: jt lines b/l, patellar & quadriceps tendons, MCL/LCL, ITB at the lateral femoral condyle & Gerdy's tubercle, pes bursa. \par ROM: full Extension, but flexion limited to 120 degrees due to stiffness and pain \par RESISTIVE TESTING: + discomfort with knee ext from deep knee flexion (stretched position), painless knee flexion. \par SPECIAL TESTS: stable v/v stress. painless grind. neg Lachman's. neg ant/post drawer. neg Jovany's. neg Thessaly test. neg Ellyn's & Malacrae's\par NEURO: Normal sensation of LE, DTRs 2+/4 patella and achilles\par PULSES: 2+ DP/PT pulses\par

## 2021-01-22 NOTE — HISTORY OF PRESENT ILLNESS
[de-identified] : Patient is here today to follow-up on knee pain.  There has been some mild improvement after the first hyaluronic acid injection performed at last visit.  No adverse reaction thus far.  Patient would like to proceed with the second injection in the series at this time.

## 2021-01-22 NOTE — DISCUSSION/SUMMARY
[de-identified] : The second and third Visco-3 injections were given today under sterile conditions into the bilateral knee joints without complication (see procedure note). I discussed the effects of this medication and how long it may provide benefit. Patient has obtained moderate immediate improvement. If no significant long-term benefit, the patient may elect for additional treatment strategies as previously discussed. However, if the patient obtains good relief of symptoms, the injection therapy series can be repeated over the next 6-12 months.\par \par Will have the patient followup on an as-needed basis at this time.  Patient appreciates and agrees with current plan.\par \par This note was generated using dragon medical dictation software.  A reasonable effort has been made for proofreading its contents, but typos may still remain.  If there are any questions or points of clarification needed please notify my office.\par

## 2021-01-29 ENCOUNTER — APPOINTMENT (OUTPATIENT)
Dept: ORTHOPEDIC SURGERY | Facility: CLINIC | Age: 66
End: 2021-01-29

## 2021-02-15 ENCOUNTER — RX RENEWAL (OUTPATIENT)
Age: 66
End: 2021-02-15

## 2021-08-10 ENCOUNTER — APPOINTMENT (OUTPATIENT)
Dept: SURGERY | Facility: CLINIC | Age: 66
End: 2021-08-10
Payer: COMMERCIAL

## 2021-08-10 VITALS
HEIGHT: 69 IN | BODY MASS INDEX: 32.58 KG/M2 | WEIGHT: 220 LBS | SYSTOLIC BLOOD PRESSURE: 128 MMHG | DIASTOLIC BLOOD PRESSURE: 82 MMHG

## 2021-08-10 PROCEDURE — 99204 OFFICE O/P NEW MOD 45 MIN: CPT

## 2021-08-11 ENCOUNTER — APPOINTMENT (OUTPATIENT)
Dept: VASCULAR SURGERY | Facility: CLINIC | Age: 66
End: 2021-08-11
Payer: COMMERCIAL

## 2021-08-11 VITALS
TEMPERATURE: 97.8 F | WEIGHT: 220 LBS | SYSTOLIC BLOOD PRESSURE: 126 MMHG | HEIGHT: 69 IN | HEART RATE: 60 BPM | BODY MASS INDEX: 32.58 KG/M2 | DIASTOLIC BLOOD PRESSURE: 81 MMHG

## 2021-08-11 DIAGNOSIS — M79.661 PAIN IN RIGHT LOWER LEG: ICD-10-CM

## 2021-08-11 PROCEDURE — 99203 OFFICE O/P NEW LOW 30 MIN: CPT

## 2021-08-12 NOTE — ASSESSMENT
[FreeTextEntry1] : appears to be benign node.  requested sonogram. to call next week for results. patient has been given the opportunity to ask questions, and all of the patient's questions have been answered to their satisfaction\par

## 2021-08-12 NOTE — CONSULT LETTER
[Dear  ___] : Dear  [unfilled], [Consult Letter:] : I had the pleasure of evaluating your patient, [unfilled]. [Please see my note below.] : Please see my note below. [Consult Closing:] : Thank you very much for allowing me to participate in the care of this patient.  If you have any questions, please do not hesitate to contact me. [Sincerely,] : Sincerely, [FreeTextEntry2] : Dr. Manuel Echols [FreeTextEntry3] : Dre Interiano MD, FACS\par System Director, Endocrine Surgery\par [facility]\par Assistant Clinical Professor of Surgery\par [employer] [facility]

## 2021-08-12 NOTE — HISTORY OF PRESENT ILLNESS
[de-identified] : Pt c/o posterior neck mass.   denies any changes in size or  pain.  denies dental or scalp infections, fever, night sweats, skin cancer excision.   last evaluated 2016 for same lesion and feels it is same size.  I have reviewed all old and new data and available images.  Additional information was obtained from others present at the time of visit to ensure the completeness of the history\par \par

## 2021-08-12 NOTE — PHYSICAL EXAM
[de-identified] : 1 cm right posterior triangle node, well circumscribed and mobile [de-identified] : no palpable thyroid nodules [Laryngoscopy Performed] : laryngoscopy was performed, see procedure section for findings [Midline] : located in midline position [Normal] : orientation to person, place, and time: normal [de-identified] : indirect  laryngoscopy shows normal vocal cord mobility bilaterally with no lesions noted

## 2021-08-17 LAB
25(OH)D3 SERPL-MCNC: 25.6 NG/ML
ALBUMIN SERPL ELPH-MCNC: 4.6 G/DL
ALP BLD-CCNC: 69 U/L
ALT SERPL-CCNC: 24 U/L
ANION GAP SERPL CALC-SCNC: 12 MMOL/L
AST SERPL-CCNC: 18 U/L
BASOPHILS # BLD AUTO: 0.07 K/UL
BASOPHILS NFR BLD AUTO: 0.8 %
BILIRUB SERPL-MCNC: 0.9 MG/DL
BUN SERPL-MCNC: 20 MG/DL
CALCIUM SERPL-MCNC: 9.2 MG/DL
CHLORIDE SERPL-SCNC: 104 MMOL/L
CHOLEST SERPL-MCNC: 135 MG/DL
CO2 SERPL-SCNC: 24 MMOL/L
CREAT SERPL-MCNC: 1.17 MG/DL
EOSINOPHIL # BLD AUTO: 0.4 K/UL
EOSINOPHIL NFR BLD AUTO: 4.5 %
ESTIMATED AVERAGE GLUCOSE: 120 MG/DL
GLUCOSE SERPL-MCNC: 93 MG/DL
HBA1C MFR BLD HPLC: 5.8 %
HCT VFR BLD CALC: 51.9 %
HDLC SERPL-MCNC: 41 MG/DL
HGB BLD-MCNC: 16.4 G/DL
IMM GRANULOCYTES NFR BLD AUTO: 0.3 %
LDLC SERPL CALC-MCNC: 63 MG/DL
LYMPHOCYTES # BLD AUTO: 3.48 K/UL
LYMPHOCYTES NFR BLD AUTO: 39.1 %
MAN DIFF?: NORMAL
MCHC RBC-ENTMCNC: 28.9 PG
MCHC RBC-ENTMCNC: 31.6 GM/DL
MCV RBC AUTO: 91.5 FL
MONOCYTES # BLD AUTO: 0.6 K/UL
MONOCYTES NFR BLD AUTO: 6.7 %
NEUTROPHILS # BLD AUTO: 4.31 K/UL
NEUTROPHILS NFR BLD AUTO: 48.6 %
NONHDLC SERPL-MCNC: 94 MG/DL
PLATELET # BLD AUTO: 126 K/UL
POTASSIUM SERPL-SCNC: 5 MMOL/L
PROT SERPL-MCNC: 6.8 G/DL
PSA SERPL-MCNC: 1.01 NG/ML
RBC # BLD: 5.67 M/UL
RBC # FLD: 13.1 %
SODIUM SERPL-SCNC: 140 MMOL/L
T4 FREE SERPL-MCNC: 1.2 NG/DL
T4 SERPL-MCNC: 6.7 UG/DL
TRIGL SERPL-MCNC: 151 MG/DL
TSH SERPL-ACNC: 2.57 UIU/ML
URATE SERPL-MCNC: 6.8 MG/DL
VIT B12 SERPL-MCNC: 617 PG/ML
WBC # FLD AUTO: 8.89 K/UL

## 2021-08-18 ENCOUNTER — RESULT REVIEW (OUTPATIENT)
Age: 66
End: 2021-08-18

## 2021-08-18 ENCOUNTER — OUTPATIENT (OUTPATIENT)
Dept: OUTPATIENT SERVICES | Facility: HOSPITAL | Age: 66
LOS: 1 days | End: 2021-08-18
Payer: COMMERCIAL

## 2021-08-18 ENCOUNTER — APPOINTMENT (OUTPATIENT)
Dept: ULTRASOUND IMAGING | Facility: IMAGING CENTER | Age: 66
End: 2021-08-18
Payer: COMMERCIAL

## 2021-08-18 DIAGNOSIS — Z98.89 OTHER SPECIFIED POSTPROCEDURAL STATES: Chronic | ICD-10-CM

## 2021-08-18 DIAGNOSIS — Z00.8 ENCOUNTER FOR OTHER GENERAL EXAMINATION: ICD-10-CM

## 2021-08-18 PROCEDURE — 76536 US EXAM OF HEAD AND NECK: CPT | Mod: 26

## 2021-08-18 PROCEDURE — 76604 US EXAM CHEST: CPT

## 2021-08-18 PROCEDURE — 76604 US EXAM CHEST: CPT | Mod: 26

## 2021-08-18 PROCEDURE — 76536 US EXAM OF HEAD AND NECK: CPT

## 2021-08-23 ENCOUNTER — NON-APPOINTMENT (OUTPATIENT)
Age: 66
End: 2021-08-23

## 2021-08-26 ENCOUNTER — NON-APPOINTMENT (OUTPATIENT)
Age: 66
End: 2021-08-26

## 2021-08-26 ENCOUNTER — APPOINTMENT (OUTPATIENT)
Dept: INTERNAL MEDICINE | Facility: CLINIC | Age: 66
End: 2021-08-26
Payer: COMMERCIAL

## 2021-08-26 VITALS
BODY MASS INDEX: 32.34 KG/M2 | SYSTOLIC BLOOD PRESSURE: 119 MMHG | WEIGHT: 219 LBS | HEART RATE: 62 BPM | DIASTOLIC BLOOD PRESSURE: 75 MMHG

## 2021-08-26 DIAGNOSIS — M54.5 LOW BACK PAIN: ICD-10-CM

## 2021-08-26 DIAGNOSIS — R59.9 ENLARGED LYMPH NODES, UNSPECIFIED: ICD-10-CM

## 2021-08-26 PROCEDURE — 81003 URINALYSIS AUTO W/O SCOPE: CPT | Mod: NC,QW

## 2021-08-26 PROCEDURE — G0444 DEPRESSION SCREEN ANNUAL: CPT | Mod: 59

## 2021-08-26 PROCEDURE — G0442 ANNUAL ALCOHOL SCREEN 15 MIN: CPT

## 2021-08-26 PROCEDURE — 93000 ELECTROCARDIOGRAM COMPLETE: CPT | Mod: 59

## 2021-08-26 PROCEDURE — 99397 PER PM REEVAL EST PAT 65+ YR: CPT | Mod: 25

## 2021-08-26 PROCEDURE — G0447 BEHAVIOR COUNSEL OBESITY 15M: CPT

## 2021-08-26 RX ORDER — HYALURONATE SODIUM, STABILIZED 88 MG/4 ML
88 SYRINGE (ML) INTRAARTICULAR
Qty: 2 | Refills: 0 | Status: DISCONTINUED | COMMUNITY
Start: 2020-12-10 | End: 2021-08-26

## 2021-08-26 NOTE — HEALTH RISK ASSESSMENT
[Very Good] : ~his/her~  mood as very good [No] : In the past 12 months have you used drugs other than those required for medical reasons? No [No falls in past year] : Patient reported no falls in the past year [0] : 2) Feeling down, depressed, or hopeless: Not at all (0) [Patient refused screening] : Patient refused screening [No Retinopathy] : No retinopathy [Patient reported colonoscopy was normal] : Patient reported colonoscopy was normal [HIV test declined] : HIV test declined [Hepatitis C test declined] : Hepatitis C test declined [None] : None [Alone] : lives alone [Employed] : employed [] :  [# Of Children ___] : has [unfilled] children [] : No [Audit-CScore] : 0 [de-identified] : [employer] [EyeExamDate] : 2021 [Change in mental status noted] : No change in mental status noted [ColonoscopyDate] : 9/2019 [FreeTextEntry3] : 2 grandchildren

## 2021-08-26 NOTE — COUNSELING
[Potential consequences of obesity discussed] : Potential consequences of obesity discussed [Structured Weight Management Program suggested:] : Structured weight management program suggested [Encouraged to maintain food diary] : Encouraged to maintain food diary [Encouraged to use exercise tracking device] : Encouraged to use exercise tracking device [FreeTextEntry1] : weight watchers

## 2021-08-26 NOTE — HISTORY OF PRESENT ILLNESS
[FreeTextEntry1] : seeing [facility] for posterior neck mass had ultrasound and told to follow in 6 months\par  saw vascular doctor f/u weasr support socks on both legs\par  s/p gel shots in both knees \par  no further kidney stones\par  has small umbilical hernia\par  retiring Sept 20

## 2021-12-01 ENCOUNTER — APPOINTMENT (OUTPATIENT)
Dept: VASCULAR SURGERY | Facility: CLINIC | Age: 66
End: 2021-12-01
Payer: MEDICARE

## 2021-12-01 VITALS
SYSTOLIC BLOOD PRESSURE: 129 MMHG | BODY MASS INDEX: 31.84 KG/M2 | HEIGHT: 69 IN | HEART RATE: 62 BPM | DIASTOLIC BLOOD PRESSURE: 74 MMHG | WEIGHT: 215 LBS

## 2021-12-01 PROCEDURE — 93971 EXTREMITY STUDY: CPT

## 2021-12-01 PROCEDURE — 99213 OFFICE O/P EST LOW 20 MIN: CPT

## 2021-12-15 ENCOUNTER — APPOINTMENT (OUTPATIENT)
Dept: VASCULAR SURGERY | Facility: CLINIC | Age: 66
End: 2021-12-15
Payer: MEDICARE

## 2021-12-15 VITALS
HEART RATE: 62 BPM | TEMPERATURE: 98 F | HEIGHT: 69 IN | SYSTOLIC BLOOD PRESSURE: 129 MMHG | BODY MASS INDEX: 31.84 KG/M2 | WEIGHT: 215 LBS | DIASTOLIC BLOOD PRESSURE: 78 MMHG

## 2021-12-15 PROCEDURE — 99213 OFFICE O/P EST LOW 20 MIN: CPT

## 2021-12-15 PROCEDURE — 93971 EXTREMITY STUDY: CPT

## 2022-01-05 ENCOUNTER — APPOINTMENT (OUTPATIENT)
Dept: VASCULAR SURGERY | Facility: CLINIC | Age: 67
End: 2022-01-05

## 2022-01-10 ENCOUNTER — APPOINTMENT (OUTPATIENT)
Dept: VASCULAR SURGERY | Facility: CLINIC | Age: 67
End: 2022-01-10

## 2022-02-03 ENCOUNTER — APPOINTMENT (OUTPATIENT)
Dept: VASCULAR SURGERY | Facility: CLINIC | Age: 67
End: 2022-02-03
Payer: COMMERCIAL

## 2022-02-03 VITALS
HEIGHT: 69 IN | SYSTOLIC BLOOD PRESSURE: 132 MMHG | WEIGHT: 215 LBS | HEART RATE: 65 BPM | BODY MASS INDEX: 31.84 KG/M2 | DIASTOLIC BLOOD PRESSURE: 78 MMHG | TEMPERATURE: 98 F

## 2022-02-03 PROCEDURE — 99213 OFFICE O/P EST LOW 20 MIN: CPT

## 2022-02-03 PROCEDURE — 93971 EXTREMITY STUDY: CPT

## 2022-02-03 NOTE — ASSESSMENT
[FreeTextEntry1] : In the office today patient underwent a venous duplex study which shows no evidence of DVT.  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  No need for intervention at this time.  Patient will continue with compression stockings and leg elevation.  He will follow-up with me as needed.

## 2022-02-03 NOTE — PHYSICAL EXAM
[JVD] : no jugular venous distention  [Normal Breath Sounds] : Normal breath sounds [Normal Rate and Rhythm] : normal rate and rhythm [2+] : left 2+ [Ankle Swelling (On Exam)] : not present [Varicose Veins Of Lower Extremities] : present [Varicose Veins Of The Left Leg] : of the left leg [Ankle Swelling On The Left] : moderate [] : not present [Abdomen Tenderness] : ~T ~M No abdominal tenderness [No Rash or Lesion] : No rash or lesion [Skin Ulcer] : no ulcer [Alert] : alert [Calm] : calm [de-identified] : Appears well

## 2022-02-03 NOTE — HISTORY OF PRESENT ILLNESS
[FreeTextEntry1] : 66-year-old gentleman with a remote history of a right lower extremity DVT which developed after a flight from [place].  At that time he was on a course of Xarelto and has subsequently been without issue.  Several months ago patient developed left leg SVT.  He notes pain has subsided.  No swelling at this time.  He has a history of left greater saphenous vein stripping and ligation many years ago.  He is here for follow-up.

## 2022-04-01 ENCOUNTER — TRANSCRIPTION ENCOUNTER (OUTPATIENT)
Age: 67
End: 2022-04-01

## 2022-04-08 ENCOUNTER — RX RENEWAL (OUTPATIENT)
Age: 67
End: 2022-04-08

## 2022-05-09 ENCOUNTER — NON-APPOINTMENT (OUTPATIENT)
Age: 67
End: 2022-05-09

## 2022-06-30 ENCOUNTER — NON-APPOINTMENT (OUTPATIENT)
Age: 67
End: 2022-06-30

## 2022-08-22 DIAGNOSIS — Z11.59 ENCOUNTER FOR SCREENING FOR OTHER VIRAL DISEASES: ICD-10-CM

## 2022-09-16 ENCOUNTER — LABORATORY RESULT (OUTPATIENT)
Age: 67
End: 2022-09-16

## 2022-09-22 LAB
25(OH)D3 SERPL-MCNC: 25 NG/ML
ALBUMIN SERPL ELPH-MCNC: 4.3 G/DL
ALP BLD-CCNC: 68 U/L
ALT SERPL-CCNC: 20 U/L
ANION GAP SERPL CALC-SCNC: 13 MMOL/L
APPEARANCE: ABNORMAL
AST SERPL-CCNC: 15 U/L
BASOPHILS # BLD AUTO: 0.09 K/UL
BASOPHILS NFR BLD AUTO: 0.9 %
BILIRUB SERPL-MCNC: 1 MG/DL
BILIRUBIN URINE: NEGATIVE
BLOOD URINE: NEGATIVE
BUN SERPL-MCNC: 21 MG/DL
CALCIUM SERPL-MCNC: 9 MG/DL
CHLORIDE SERPL-SCNC: 105 MMOL/L
CHOLEST SERPL-MCNC: 131 MG/DL
CK SERPL-CCNC: 114 U/L
CO2 SERPL-SCNC: 23 MMOL/L
COLOR: YELLOW
COVID-19 NUCLEOCAPSID  GAM ANTIBODY INTERPRETATION: NEGATIVE
COVID-19 SPIKE DOMAIN ANTIBODY INTERPRETATION: POSITIVE
CREAT SERPL-MCNC: 1.17 MG/DL
EGFR: 68 ML/MIN/1.73M2
EOSINOPHIL # BLD AUTO: 0.23 K/UL
EOSINOPHIL NFR BLD AUTO: 2.4 %
ESTIMATED AVERAGE GLUCOSE: 123 MG/DL
GLUCOSE QUALITATIVE U: NEGATIVE
GLUCOSE SERPL-MCNC: 103 MG/DL
HBA1C MFR BLD HPLC: 5.9 %
HCT VFR BLD CALC: 51.1 %
HDLC SERPL-MCNC: 39 MG/DL
HGB BLD-MCNC: 16.5 G/DL
IMM GRANULOCYTES NFR BLD AUTO: 0.3 %
KETONES URINE: NEGATIVE
LDLC SERPL CALC-MCNC: 63 MG/DL
LEUKOCYTE ESTERASE URINE: NEGATIVE
LYMPHOCYTES # BLD AUTO: 4.07 K/UL
LYMPHOCYTES NFR BLD AUTO: 42.7 %
MAN DIFF?: NORMAL
MCHC RBC-ENTMCNC: 29.5 PG
MCHC RBC-ENTMCNC: 32.3 GM/DL
MCV RBC AUTO: 91.3 FL
MONOCYTES # BLD AUTO: 0.71 K/UL
MONOCYTES NFR BLD AUTO: 7.4 %
NEUTROPHILS # BLD AUTO: 4.41 K/UL
NEUTROPHILS NFR BLD AUTO: 46.3 %
NITRITE URINE: NEGATIVE
NONHDLC SERPL-MCNC: 92 MG/DL
PH URINE: 6
PLATELET # BLD AUTO: 131 K/UL
POTASSIUM SERPL-SCNC: 4.5 MMOL/L
PROT SERPL-MCNC: 6.9 G/DL
PROTEIN URINE: NEGATIVE
RBC # BLD: 5.6 M/UL
RBC # FLD: 13 %
SARS-COV-2 AB SERPL IA-ACNC: >250 U/ML
SARS-COV-2 AB SERPL QL IA: 0.26 INDEX
SODIUM SERPL-SCNC: 141 MMOL/L
SPECIFIC GRAVITY URINE: >=1.03
T4 FREE SERPL-MCNC: 1.3 NG/DL
T4 SERPL-MCNC: 7 UG/DL
TRIGL SERPL-MCNC: 146 MG/DL
TSH SERPL-ACNC: 1.62 UIU/ML
URATE SERPL-MCNC: 7.5 MG/DL
UROBILINOGEN URINE: NORMAL
VIT B12 SERPL-MCNC: 597 PG/ML
WBC # FLD AUTO: 9.54 K/UL

## 2022-09-29 ENCOUNTER — APPOINTMENT (OUTPATIENT)
Dept: INTERNAL MEDICINE | Facility: CLINIC | Age: 67
End: 2022-09-29

## 2022-09-29 ENCOUNTER — NON-APPOINTMENT (OUTPATIENT)
Age: 67
End: 2022-09-29

## 2022-09-29 VITALS
BODY MASS INDEX: 31.84 KG/M2 | DIASTOLIC BLOOD PRESSURE: 71 MMHG | OXYGEN SATURATION: 97 % | SYSTOLIC BLOOD PRESSURE: 124 MMHG | TEMPERATURE: 98 F | HEART RATE: 62 BPM | WEIGHT: 215 LBS | HEIGHT: 69 IN

## 2022-09-29 DIAGNOSIS — I51.7 CARDIOMEGALY: ICD-10-CM

## 2022-09-29 DIAGNOSIS — I26.99 OTHER PULMONARY EMBOLISM W/OUT ACUTE COR PULMONALE: ICD-10-CM

## 2022-09-29 PROCEDURE — 93000 ELECTROCARDIOGRAM COMPLETE: CPT

## 2022-09-29 PROCEDURE — 90472 IMMUNIZATION ADMIN EACH ADD: CPT

## 2022-09-29 PROCEDURE — G0009: CPT

## 2022-09-29 PROCEDURE — 36415 COLL VENOUS BLD VENIPUNCTURE: CPT

## 2022-09-29 PROCEDURE — 90677 PCV20 VACCINE IM: CPT

## 2022-09-29 PROCEDURE — 90662 IIV NO PRSV INCREASED AG IM: CPT

## 2022-09-29 PROCEDURE — G0447 BEHAVIOR COUNSEL OBESITY 15M: CPT

## 2022-09-29 PROCEDURE — G0438: CPT

## 2022-09-29 NOTE — HISTORY OF PRESENT ILLNESS
[FreeTextEntry1] : seeing [facility] for posterior neck mass had ultrasound and told to follow in 6 months\par  saw vascular doctor f/u wear support socks on both legs\par  s/p gel shots in both knees \par  no further kidney stones\par  has small umbilical hernia\par  retiring Sept 20\par

## 2022-09-29 NOTE — PHYSICAL EXAM
[No Acute Distress] : no acute distress [Well Nourished] : well nourished [Well Developed] : well developed [Well-Appearing] : well-appearing [Normal Sclera/Conjunctiva] : normal sclera/conjunctiva [PERRL] : pupils equal round and reactive to light [EOMI] : extraocular movements intact [Normal Outer Ear/Nose] : the outer ears and nose were normal in appearance [Normal Oropharynx] : the oropharynx was normal [No JVD] : no jugular venous distention [No Lymphadenopathy] : no lymphadenopathy [Supple] : supple [Thyroid Normal, No Nodules] : the thyroid was normal and there were no nodules present [No Respiratory Distress] : no respiratory distress  [No Accessory Muscle Use] : no accessory muscle use [Normal Rate] : normal rate  [Regular Rhythm] : with a regular rhythm [Normal S1, S2] : normal S1 and S2 [No Murmur] : no murmur heard [No Carotid Bruits] : no carotid bruits [No Abdominal Bruit] : a ~M bruit was not heard ~T in the abdomen [No Varicosities] : no varicosities [Pedal Pulses Present] : the pedal pulses are present [No Edema] : there was no peripheral edema [No Palpable Aorta] : no palpable aorta [No Extremity Clubbing/Cyanosis] : no extremity clubbing/cyanosis [Soft] : abdomen soft [Non Tender] : non-tender [Non-distended] : non-distended [No Masses] : no abdominal mass palpated [No HSM] : no HSM [Normal Bowel Sounds] : normal bowel sounds [Normal Posterior Cervical Nodes] : no posterior cervical lymphadenopathy [Normal Anterior Cervical Nodes] : no anterior cervical lymphadenopathy [No CVA Tenderness] : no CVA  tenderness [No Spinal Tenderness] : no spinal tenderness [No Joint Swelling] : no joint swelling [Grossly Normal Strength/Tone] : grossly normal strength/tone [No Rash] : no rash [Coordination Grossly Intact] : coordination grossly intact [No Focal Deficits] : no focal deficits [Normal Gait] : normal gait [Deep Tendon Reflexes (DTR)] : deep tendon reflexes were 2+ and symmetric [Normal Affect] : the affect was normal [Normal Insight/Judgement] : insight and judgment were intact [de-identified] : crackles at bases

## 2022-09-29 NOTE — HEALTH RISK ASSESSMENT
[Very Good] : ~his/her~  mood as very good [Never] : Never [No] : In the past 12 months have you used drugs other than those required for medical reasons? No [No falls in past year] : Patient reported no falls in the past year [0] : 2) Feeling down, depressed, or hopeless: Not at all (0) [PHQ-2 Negative - No further assessment needed] : PHQ-2 Negative - No further assessment needed [No Retinopathy] : No retinopathy [HIV test declined] : HIV test declined [Hepatitis C test declined] : Hepatitis C test declined [None] : None [With Significant Other] : lives with significant other [] :  [# Of Children ___] : has [unfilled] children [Sexually Active] : sexually active [Feels Safe at Home] : Feels safe at home [Fully functional (bathing, dressing, toileting, transferring, walking, feeding)] : Fully functional (bathing, dressing, toileting, transferring, walking, feeding) [Fully functional (using the telephone, shopping, preparing meals, housekeeping, doing laundry, using] : Fully functional and needs no help or supervision to perform IADLs (using the telephone, shopping, preparing meals, housekeeping, doing laundry, using transportation, managing medications and managing finances) [Smoke Detector] : smoke detector [Carbon Monoxide Detector] : carbon monoxide detector [Seat Belt] :  uses seat belt [Sunscreen] : uses sunscreen [Audit-CScore] : 0 [HVJ4Xkhzt] : 0 [EyeExamDate] : 2022 [Change in mental status noted] : No change in mental status noted [High Risk Behavior] : no high risk behavior [Reports changes in hearing] : Reports no changes in hearing [Reports changes in vision] : Reports no changes in vision [Reports changes in dental health] : Reports no changes in dental health [ColonoscopyDate] : 9/2019 [FreeTextEntry3] : 3 grandchildren

## 2022-09-29 NOTE — COUNSELING
[Potential consequences of obesity discussed] : Potential consequences of obesity discussed [Benefits of weight loss discussed] : Benefits of weight loss discussed [Encouraged to use exercise tracking device] : Encouraged to use exercise tracking device [Good understanding] : Patient has a good understanding of disease, goals and obesity follow-up plan [FreeTextEntry4] : 15

## 2022-09-30 ENCOUNTER — OUTPATIENT (OUTPATIENT)
Dept: OUTPATIENT SERVICES | Facility: HOSPITAL | Age: 67
LOS: 1 days | End: 2022-09-30
Payer: MEDICARE

## 2022-09-30 ENCOUNTER — APPOINTMENT (OUTPATIENT)
Dept: RADIOLOGY | Facility: CLINIC | Age: 67
End: 2022-09-30

## 2022-09-30 ENCOUNTER — RESULT REVIEW (OUTPATIENT)
Age: 67
End: 2022-09-30

## 2022-09-30 DIAGNOSIS — Z98.89 OTHER SPECIFIED POSTPROCEDURAL STATES: Chronic | ICD-10-CM

## 2022-09-30 DIAGNOSIS — Z00.8 ENCOUNTER FOR OTHER GENERAL EXAMINATION: ICD-10-CM

## 2022-09-30 PROCEDURE — 71046 X-RAY EXAM CHEST 2 VIEWS: CPT | Mod: 26

## 2022-09-30 PROCEDURE — 71046 X-RAY EXAM CHEST 2 VIEWS: CPT

## 2022-10-07 ENCOUNTER — NON-APPOINTMENT (OUTPATIENT)
Age: 67
End: 2022-10-07

## 2022-10-07 LAB — PSA SERPL-MCNC: 0.94 NG/ML

## 2022-11-10 ENCOUNTER — APPOINTMENT (OUTPATIENT)
Dept: ORTHOPEDIC SURGERY | Facility: CLINIC | Age: 67
End: 2022-11-10

## 2022-11-10 VITALS
HEIGHT: 69 IN | TEMPERATURE: 97.6 F | HEART RATE: 65 BPM | WEIGHT: 215 LBS | DIASTOLIC BLOOD PRESSURE: 73 MMHG | SYSTOLIC BLOOD PRESSURE: 144 MMHG | OXYGEN SATURATION: 96 % | BODY MASS INDEX: 31.84 KG/M2

## 2022-11-10 DIAGNOSIS — M70.61 TROCHANTERIC BURSITIS, RIGHT HIP: ICD-10-CM

## 2022-11-10 PROCEDURE — 99214 OFFICE O/P EST MOD 30 MIN: CPT | Mod: 25

## 2022-11-10 PROCEDURE — 20610 DRAIN/INJ JOINT/BURSA W/O US: CPT | Mod: RT

## 2022-11-10 NOTE — PROCEDURE
[de-identified] : Injection: Right  Hip.\par Indication: Trochanteric Bursitis.\par \par A discussion was had with the patient regarding this procedure and all questions were answered. All risks, benefits and alternatives were discussed. These included but were not limited to bleeding, infection, and allergic reaction. A timeout was done to ensure correct side and patient agreed to the procedure.  A Gulkana von was created on the skin utilizing a plastic needle cap to von the anticipated point of entry. Alcohol was used to clean the skin, and Betadine was used to sterilize and prep the area in the lateral aspect of the hip. A timeout was done to ensure correct side and pt agreed to the procedure.  Ethyl chloride spray was then used as a topical anesthetic. A 25-gauge needle was used to inject 2cc of 0.25% bupivacaine and 1cc of 40mg/ml methylprednisolone into the greater trochanteric bursa using a needling technique. A sterile bandage was then applied. The patient tolerated the procedure well and there were no complications\par

## 2022-11-10 NOTE — DISCUSSION/SUMMARY
[de-identified] : Discussed findings of today's exam and possible causes of patient's pain.  Educated patient on their most probable diagnosis of chronic intermittent right hip pain with recent atraumatic exacerbation due to trochanteric bursitis and peritrochanteric tendinitis.  Reviewed possible courses of treatment, and we collaboratively decided best course of treatment at this time will include conservative management.  Patient is already tried acupuncture and oral Tylenol without relief.  We discussed various treatment options as well as associated risk/benefits/alternatives and patient elected to proceed with cortisone injection today (see procedure note).  Informed the patient that the numbing medicine in today's injection will last for about 4-6 hours. The steroid that was injected will start to work in 1 to 2 days, peak at 1-2 weeks, and may last up to 1-2 months.  Patient will be started on a course of physical therapy to restore normal range of motion and strength as tolerated.  Patient may try topical lidocaine patch to be worn at night as that is when he is having most of his pain and difficulty sleeping.  Follow up as needed.  Patient and spouse appreciate and agree with current plan.\par \par I work as part of an academic orthopedic group and routinely have a physician in training (resident / fellow) working with me.  Any part of the history and physical exam performed by the physician in training was either directly reviewed and/or replicated by myself.  Any procedure performed by the physician in training was performed under my direct supervision and with the consent of the patient.\par \par This note was generated using dragon medical dictation software.  A reasonable effort has been made for proofreading its contents, but typos may still remain.  If there are any questions or points of clarification needed please notify my office.

## 2022-11-14 ENCOUNTER — TRANSCRIPTION ENCOUNTER (OUTPATIENT)
Age: 67
End: 2022-11-14

## 2023-04-02 ENCOUNTER — NON-APPOINTMENT (OUTPATIENT)
Age: 68
End: 2023-04-02

## 2023-04-19 ENCOUNTER — APPOINTMENT (OUTPATIENT)
Dept: INTERNAL MEDICINE | Facility: CLINIC | Age: 68
End: 2023-04-19
Payer: MEDICARE

## 2023-04-19 VITALS
HEIGHT: 69 IN | SYSTOLIC BLOOD PRESSURE: 111 MMHG | OXYGEN SATURATION: 98 % | DIASTOLIC BLOOD PRESSURE: 69 MMHG | BODY MASS INDEX: 31.1 KG/M2 | TEMPERATURE: 98 F | HEART RATE: 60 BPM | WEIGHT: 210 LBS

## 2023-04-19 VITALS — SYSTOLIC BLOOD PRESSURE: 111 MMHG | DIASTOLIC BLOOD PRESSURE: 69 MMHG

## 2023-04-19 PROCEDURE — 99213 OFFICE O/P EST LOW 20 MIN: CPT

## 2023-04-19 NOTE — HISTORY OF PRESENT ILLNESS
[FreeTextEntry1] : had Covid 3/23 confirmed [facility] steroids and [facility]

## 2023-06-06 NOTE — ED PROVIDER NOTE - NS ED ROS FT
REVIEW OF SYSTEMS:  General:  no fever, no chills  HEENT: no headache, no vision changes  Cardiac: no chest pain, no palpitations  Respiratory: no cough, no shortness of breath  Gastrointestinal: +abdominal pain, +nausea, +vomiting, no diarrhea  Genitourinary: no hematuria, no dysuria, no urinary frequency, no urinary hesitancy   Extremities: no extremity swelling, no extremity pain  Neuro: no focal weakness, no numbness/tingling of the extremities, no decreased sensation  Heme: no easy bleeding, no easy bruising, no anemia  Skin: no abrasions, no jaundice, no pruritis, no rashes, no lesions  -García Mireles, PGY-2 Improved

## 2023-08-18 ENCOUNTER — NON-APPOINTMENT (OUTPATIENT)
Age: 68
End: 2023-08-18

## 2023-08-28 ENCOUNTER — NON-APPOINTMENT (OUTPATIENT)
Age: 68
End: 2023-08-28

## 2023-08-31 ENCOUNTER — APPOINTMENT (OUTPATIENT)
Dept: INTERNAL MEDICINE | Facility: CLINIC | Age: 68
End: 2023-08-31
Payer: MEDICARE

## 2023-08-31 VITALS
HEIGHT: 69 IN | HEART RATE: 90 BPM | OXYGEN SATURATION: 94 % | SYSTOLIC BLOOD PRESSURE: 126 MMHG | WEIGHT: 205 LBS | DIASTOLIC BLOOD PRESSURE: 74 MMHG | BODY MASS INDEX: 30.36 KG/M2

## 2023-08-31 DIAGNOSIS — R06.02 SHORTNESS OF BREATH: ICD-10-CM

## 2023-08-31 PROCEDURE — 99213 OFFICE O/P EST LOW 20 MIN: CPT

## 2023-08-31 NOTE — PHYSICAL EXAM
[No Acute Distress] : no acute distress [Well Nourished] : well nourished [Well Developed] : well developed [Well-Appearing] : well-appearing [Normal Sclera/Conjunctiva] : normal sclera/conjunctiva [PERRL] : pupils equal round and reactive to light [EOMI] : extraocular movements intact [Normal Outer Ear/Nose] : the outer ears and nose were normal in appearance [Normal Oropharynx] : the oropharynx was normal [No JVD] : no jugular venous distention [No Lymphadenopathy] : no lymphadenopathy [Supple] : supple [Thyroid Normal, No Nodules] : the thyroid was normal and there were no nodules present [No Respiratory Distress] : no respiratory distress  [No Accessory Muscle Use] : no accessory muscle use [Normal Rate] : normal rate  [Regular Rhythm] : with a regular rhythm [Normal S1, S2] : normal S1 and S2 [No Murmur] : no murmur heard [No Carotid Bruits] : no carotid bruits [No Abdominal Bruit] : a ~M bruit was not heard ~T in the abdomen [No Varicosities] : no varicosities [Pedal Pulses Present] : the pedal pulses are present [No Edema] : there was no peripheral edema [No Palpable Aorta] : no palpable aorta [No Extremity Clubbing/Cyanosis] : no extremity clubbing/cyanosis [Soft] : abdomen soft [Non Tender] : non-tender [Non-distended] : non-distended [No Masses] : no abdominal mass palpated [No HSM] : no HSM [Normal Bowel Sounds] : normal bowel sounds [Normal Posterior Cervical Nodes] : no posterior cervical lymphadenopathy [Normal Anterior Cervical Nodes] : no anterior cervical lymphadenopathy [No CVA Tenderness] : no CVA  tenderness [No Spinal Tenderness] : no spinal tenderness [No Joint Swelling] : no joint swelling [Grossly Normal Strength/Tone] : grossly normal strength/tone [No Rash] : no rash [Coordination Grossly Intact] : coordination grossly intact [No Focal Deficits] : no focal deficits [Normal Gait] : normal gait [Deep Tendon Reflexes (DTR)] : deep tendon reflexes were 2+ and symmetric [Normal Affect] : the affect was normal [Normal Insight/Judgement] : insight and judgment were intact [de-identified] : bilateral diffuse wheezing

## 2023-08-31 NOTE — HISTORY OF PRESENT ILLNESS
LOV 11/18/2022 Anxiety (Video Visit)  NOV  Not scheduled    Carboxymeth-Cellulose-CitricAc (Plenity) Cap 180 capsule 0 11/8/2022     Sig - Route: Take 3 capsules by mouth 2 times daily. Before lunch and dinner. - Oral    Sent to pharmacy as: Plenity Oral Capsule    Class: Eprescribe    Notes to Pharmacy: Patient due for Medication Check    E-Prescribing Status: Receipt confirmed by pharmacy (11/8/2022 11:52 AM CST)        
Pharmacy faxed first request-  
[FreeTextEntry1] : 8/19 seen at [facility] foor cough   sent home . went back on 8/29- cough worsened had lots of phlegm. no fever. place on Augmentin and azithromycin inhaler and cough medicine he is slowly feeling better. 2 weeks ago had another gout attack and used Prednisone. This past Tuesday had gout of right wrist and swellng is resolving

## 2023-09-06 ENCOUNTER — APPOINTMENT (OUTPATIENT)
Dept: INTERNAL MEDICINE | Facility: CLINIC | Age: 68
End: 2023-09-06
Payer: MEDICARE

## 2023-09-06 VITALS
HEIGHT: 69 IN | OXYGEN SATURATION: 95 % | HEART RATE: 74 BPM | SYSTOLIC BLOOD PRESSURE: 127 MMHG | BODY MASS INDEX: 30.36 KG/M2 | WEIGHT: 205 LBS | DIASTOLIC BLOOD PRESSURE: 79 MMHG

## 2023-09-06 DIAGNOSIS — J45.901 UNSPECIFIED ASTHMA WITH (ACUTE) EXACERBATION: ICD-10-CM

## 2023-09-06 DIAGNOSIS — E53.8 DEFICIENCY OF OTHER SPECIFIED B GROUP VITAMINS: ICD-10-CM

## 2023-09-06 DIAGNOSIS — E55.9 VITAMIN D DEFICIENCY, UNSPECIFIED: ICD-10-CM

## 2023-09-06 DIAGNOSIS — E07.9 DISORDER OF THYROID, UNSPECIFIED: ICD-10-CM

## 2023-09-06 DIAGNOSIS — R35.1 BENIGN PROSTATIC HYPERPLASIA WITH LOWER URINARY TRACT SYMPMS: ICD-10-CM

## 2023-09-06 DIAGNOSIS — N40.1 BENIGN PROSTATIC HYPERPLASIA WITH LOWER URINARY TRACT SYMPMS: ICD-10-CM

## 2023-09-06 PROCEDURE — 99213 OFFICE O/P EST LOW 20 MIN: CPT

## 2023-09-06 NOTE — PHYSICAL EXAM
[No Acute Distress] : no acute distress [Well Nourished] : well nourished [Well Developed] : well developed [Well-Appearing] : well-appearing [Normal Sclera/Conjunctiva] : normal sclera/conjunctiva [PERRL] : pupils equal round and reactive to light [EOMI] : extraocular movements intact [Normal Outer Ear/Nose] : the outer ears and nose were normal in appearance [Normal Oropharynx] : the oropharynx was normal [No JVD] : no jugular venous distention [No Lymphadenopathy] : no lymphadenopathy [Supple] : supple [Thyroid Normal, No Nodules] : the thyroid was normal and there were no nodules present [No Respiratory Distress] : no respiratory distress  [No Accessory Muscle Use] : no accessory muscle use [Normal Rate] : normal rate  [Regular Rhythm] : with a regular rhythm [Normal S1, S2] : normal S1 and S2 [No Murmur] : no murmur heard [No Carotid Bruits] : no carotid bruits [No Abdominal Bruit] : a ~M bruit was not heard ~T in the abdomen [No Varicosities] : no varicosities [Pedal Pulses Present] : the pedal pulses are present [No Edema] : there was no peripheral edema [No Palpable Aorta] : no palpable aorta [No Extremity Clubbing/Cyanosis] : no extremity clubbing/cyanosis [Soft] : abdomen soft [Non Tender] : non-tender [Non-distended] : non-distended [No Masses] : no abdominal mass palpated [No HSM] : no HSM [Normal Bowel Sounds] : normal bowel sounds [Normal Posterior Cervical Nodes] : no posterior cervical lymphadenopathy [Normal Anterior Cervical Nodes] : no anterior cervical lymphadenopathy [No CVA Tenderness] : no CVA  tenderness [No Spinal Tenderness] : no spinal tenderness [No Joint Swelling] : no joint swelling [Grossly Normal Strength/Tone] : grossly normal strength/tone [No Rash] : no rash [Coordination Grossly Intact] : coordination grossly intact [No Focal Deficits] : no focal deficits [Normal Gait] : normal gait [Deep Tendon Reflexes (DTR)] : deep tendon reflexes were 2+ and symmetric [Normal Affect] : the affect was normal [Normal Insight/Judgement] : insight and judgment were intact [de-identified] : clear bilaterally

## 2023-09-06 NOTE — HISTORY OF PRESENT ILLNESS
[FreeTextEntry1] : on augmentin last few days feeling better rare cough sleeping through the night. using saline nebilizer

## 2023-09-14 DIAGNOSIS — J18.9 PNEUMONIA, UNSPECIFIED ORGANISM: ICD-10-CM

## 2023-09-15 ENCOUNTER — APPOINTMENT (OUTPATIENT)
Dept: ORTHOPEDIC SURGERY | Facility: CLINIC | Age: 68
End: 2023-09-15
Payer: MEDICARE

## 2023-09-15 PROCEDURE — 99214 OFFICE O/P EST MOD 30 MIN: CPT | Mod: 25

## 2023-09-15 PROCEDURE — 20600 DRAIN/INJ JOINT/BURSA W/O US: CPT | Mod: RT

## 2023-09-27 ENCOUNTER — RX RENEWAL (OUTPATIENT)
Age: 68
End: 2023-09-27

## 2023-10-05 ENCOUNTER — APPOINTMENT (OUTPATIENT)
Dept: RADIOLOGY | Facility: CLINIC | Age: 68
End: 2023-10-05

## 2023-10-05 ENCOUNTER — OUTPATIENT (OUTPATIENT)
Dept: OUTPATIENT SERVICES | Facility: HOSPITAL | Age: 68
LOS: 1 days | End: 2023-10-05
Payer: MEDICARE

## 2023-10-05 DIAGNOSIS — J18.9 PNEUMONIA, UNSPECIFIED ORGANISM: ICD-10-CM

## 2023-10-05 DIAGNOSIS — Z98.89 OTHER SPECIFIED POSTPROCEDURAL STATES: Chronic | ICD-10-CM

## 2023-10-05 PROCEDURE — 71046 X-RAY EXAM CHEST 2 VIEWS: CPT

## 2023-10-05 PROCEDURE — 71046 X-RAY EXAM CHEST 2 VIEWS: CPT | Mod: 26

## 2023-10-09 ENCOUNTER — NON-APPOINTMENT (OUTPATIENT)
Age: 68
End: 2023-10-09

## 2023-10-09 ENCOUNTER — APPOINTMENT (OUTPATIENT)
Dept: INTERNAL MEDICINE | Facility: CLINIC | Age: 68
End: 2023-10-09
Payer: MEDICARE

## 2023-10-09 VITALS
BODY MASS INDEX: 30.36 KG/M2 | SYSTOLIC BLOOD PRESSURE: 120 MMHG | OXYGEN SATURATION: 98 % | HEART RATE: 76 BPM | DIASTOLIC BLOOD PRESSURE: 70 MMHG | HEIGHT: 69 IN | WEIGHT: 205 LBS

## 2023-10-09 DIAGNOSIS — Z23 ENCOUNTER FOR IMMUNIZATION: ICD-10-CM

## 2023-10-09 DIAGNOSIS — I25.10 ATHEROSCLEROTIC HEART DISEASE OF NATIVE CORONARY ARTERY W/OUT ANGINA PECTORIS: ICD-10-CM

## 2023-10-09 DIAGNOSIS — E66.9 OBESITY, UNSPECIFIED: ICD-10-CM

## 2023-10-09 DIAGNOSIS — M1A.0710 IDIOPATHIC CHRONIC GOUT, RIGHT ANKLE AND FOOT, W/OUT TOPHUS (TOPHI): ICD-10-CM

## 2023-10-09 DIAGNOSIS — R00.2 PALPITATIONS: ICD-10-CM

## 2023-10-09 DIAGNOSIS — Z87.898 PERSONAL HISTORY OF OTHER SPECIFIED CONDITIONS: ICD-10-CM

## 2023-10-09 DIAGNOSIS — Z00.00 ENCOUNTER FOR GENERAL ADULT MEDICAL EXAMINATION W/OUT ABNORMAL FINDINGS: ICD-10-CM

## 2023-10-09 LAB
25(OH)D3 SERPL-MCNC: 26.6 NG/ML
ALBUMIN SERPL ELPH-MCNC: 4.3 G/DL
ALP BLD-CCNC: 67 U/L
ALT SERPL-CCNC: 20 U/L
ANION GAP SERPL CALC-SCNC: 13 MMOL/L
APPEARANCE: CLEAR
AST SERPL-CCNC: 20 U/L
BASOPHILS # BLD AUTO: 0.06 K/UL
BASOPHILS NFR BLD AUTO: 0.8 %
BILIRUB SERPL-MCNC: 1.2 MG/DL
BILIRUBIN URINE: NEGATIVE
BLOOD URINE: NEGATIVE
BUN SERPL-MCNC: 20 MG/DL
CALCIUM SERPL-MCNC: 8.7 MG/DL
CHLORIDE SERPL-SCNC: 105 MMOL/L
CHOLEST SERPL-MCNC: 159 MG/DL
CK SERPL-CCNC: 176 U/L
CO2 SERPL-SCNC: 22 MMOL/L
COLOR: NORMAL
CREAT SERPL-MCNC: 1.14 MG/DL
EGFR: 70 ML/MIN/1.73M2
EOSINOPHIL # BLD AUTO: 0.18 K/UL
EOSINOPHIL NFR BLD AUTO: 2.5 %
ESTIMATED AVERAGE GLUCOSE: 140 MG/DL
GLUCOSE QUALITATIVE U: NEGATIVE MG/DL
GLUCOSE SERPL-MCNC: 93 MG/DL
HBA1C MFR BLD HPLC: 6.5 %
HCT VFR BLD CALC: 47.2 %
HDLC SERPL-MCNC: 47 MG/DL
HGB BLD-MCNC: 15.4 G/DL
IMM GRANULOCYTES NFR BLD AUTO: 0.4 %
KETONES URINE: NEGATIVE MG/DL
LDLC SERPL CALC-MCNC: 91 MG/DL
LEUKOCYTE ESTERASE URINE: NEGATIVE
LYMPHOCYTES # BLD AUTO: 2.83 K/UL
LYMPHOCYTES NFR BLD AUTO: 38.9 %
MAN DIFF?: NORMAL
MCHC RBC-ENTMCNC: 28.6 PG
MCHC RBC-ENTMCNC: 32.6 GM/DL
MCV RBC AUTO: 87.7 FL
MONOCYTES # BLD AUTO: 0.58 K/UL
MONOCYTES NFR BLD AUTO: 8 %
NEUTROPHILS # BLD AUTO: 3.6 K/UL
NEUTROPHILS NFR BLD AUTO: 49.4 %
NITRITE URINE: NEGATIVE
NONHDLC SERPL-MCNC: 112 MG/DL
PH URINE: 5.5
PLATELET # BLD AUTO: 149 K/UL
POTASSIUM SERPL-SCNC: 4.4 MMOL/L
PROT SERPL-MCNC: 6.4 G/DL
PROTEIN URINE: NORMAL MG/DL
PSA SERPL-MCNC: 2 NG/ML
RBC # BLD: 5.38 M/UL
RBC # FLD: 13.6 %
SODIUM SERPL-SCNC: 140 MMOL/L
SPECIFIC GRAVITY URINE: 1.03
T4 FREE SERPL-MCNC: 1.2 NG/DL
T4 SERPL-MCNC: 6.6 UG/DL
TRIGL SERPL-MCNC: 116 MG/DL
TSH SERPL-ACNC: 1.64 UIU/ML
URATE SERPL-MCNC: 6.7 MG/DL
UROBILINOGEN URINE: 1 MG/DL
VIT B12 SERPL-MCNC: 657 PG/ML
WBC # FLD AUTO: 7.28 K/UL

## 2023-10-09 PROCEDURE — 90662 IIV NO PRSV INCREASED AG IM: CPT

## 2023-10-09 PROCEDURE — G0447 BEHAVIOR COUNSEL OBESITY 15M: CPT

## 2023-10-09 PROCEDURE — G0438: CPT

## 2023-10-09 PROCEDURE — G0008: CPT

## 2023-10-09 PROCEDURE — 93000 ELECTROCARDIOGRAM COMPLETE: CPT

## 2023-10-09 RX ORDER — KETOCONAZOLE 20.5 MG/ML
2 SHAMPOO, SUSPENSION TOPICAL
Qty: 120 | Refills: 3 | Status: ACTIVE | COMMUNITY
Start: 2019-08-28 | End: 1900-01-01

## 2023-10-09 RX ORDER — FLUOCINONIDE 0.5 MG/ML
0.05 SOLUTION TOPICAL TWICE DAILY
Qty: 1 | Refills: 3 | Status: ACTIVE | COMMUNITY
Start: 2019-08-28 | End: 1900-01-01

## 2023-10-09 RX ORDER — ATORVASTATIN CALCIUM 10 MG/1
10 TABLET, FILM COATED ORAL
Qty: 90 | Refills: 3 | Status: ACTIVE | COMMUNITY
Start: 2019-08-29 | End: 1900-01-01

## 2023-10-09 RX ORDER — PREDNISONE 10 MG/1
10 TABLET ORAL
Qty: 30 | Refills: 0 | Status: COMPLETED | COMMUNITY
Start: 2023-08-31 | End: 2023-10-09

## 2023-10-16 DIAGNOSIS — I80.02 PHLEBITIS AND THROMBOPHLEBITIS OF SUPERFICIAL VESSELS OF LEFT LOWER EXTREMITY: ICD-10-CM

## 2023-10-18 ENCOUNTER — APPOINTMENT (OUTPATIENT)
Dept: VASCULAR SURGERY | Facility: CLINIC | Age: 68
End: 2023-10-18
Payer: MEDICARE

## 2023-10-18 PROCEDURE — 93971 EXTREMITY STUDY: CPT | Mod: LT

## 2023-10-24 ENCOUNTER — APPOINTMENT (OUTPATIENT)
Dept: VASCULAR SURGERY | Facility: CLINIC | Age: 68
End: 2023-10-24

## 2023-10-30 ENCOUNTER — APPOINTMENT (OUTPATIENT)
Dept: UROLOGY | Facility: CLINIC | Age: 68
End: 2023-10-30
Payer: MEDICARE

## 2023-10-30 VITALS
DIASTOLIC BLOOD PRESSURE: 69 MMHG | HEART RATE: 59 BPM | OXYGEN SATURATION: 95 % | SYSTOLIC BLOOD PRESSURE: 129 MMHG | HEIGHT: 69 IN | WEIGHT: 205 LBS | BODY MASS INDEX: 30.36 KG/M2 | TEMPERATURE: 97 F

## 2023-10-30 DIAGNOSIS — Z87.442 PERSONAL HISTORY OF URINARY CALCULI: ICD-10-CM

## 2023-10-30 DIAGNOSIS — R97.20 ELEVATED PROSTATE, SPECIFIC ANTIGEN [PSA]: ICD-10-CM

## 2023-10-30 PROCEDURE — 99203 OFFICE O/P NEW LOW 30 MIN: CPT

## 2023-11-01 LAB — BACTERIA UR CULT: NORMAL

## 2023-11-02 ENCOUNTER — APPOINTMENT (OUTPATIENT)
Dept: VASCULAR SURGERY | Facility: CLINIC | Age: 68
End: 2023-11-02
Payer: MEDICARE

## 2023-11-02 PROCEDURE — 99214 OFFICE O/P EST MOD 30 MIN: CPT

## 2023-11-18 LAB — PSA SERPL-MCNC: 1.18 NG/ML

## 2024-01-18 ENCOUNTER — APPOINTMENT (OUTPATIENT)
Dept: VASCULAR SURGERY | Facility: CLINIC | Age: 69
End: 2024-01-18
Payer: MEDICARE

## 2024-01-18 PROCEDURE — 99214 OFFICE O/P EST MOD 30 MIN: CPT

## 2024-01-18 PROCEDURE — 93971 EXTREMITY STUDY: CPT | Mod: LT

## 2024-01-18 RX ORDER — DOXYCYCLINE HYCLATE 100 MG/1
100 CAPSULE ORAL
Qty: 20 | Refills: 0 | Status: COMPLETED | COMMUNITY
Start: 2022-05-10 | End: 2024-01-18

## 2024-01-18 NOTE — PROCEDURE
[FreeTextEntry1] : Given the symptoms in the office today patient underwent a duplex study which shows no evidence of DVT.  The left greater saphenous vein is ablated throughout the thigh.  There is a thrombosed subacute greater saphenous vein tributary.  At this time there is no urgent intervention needed.  If the symptoms continue patient can be scheduled for a ablation of the vein in the calf along with a phlebectomy.  In the interim, he will continue with a baby aspirin and compression stockings.

## 2024-01-18 NOTE — PHYSICAL EXAM
[JVD] : no jugular venous distention  [Normal Breath Sounds] : Normal breath sounds [Normal Rate and Rhythm] : normal rate and rhythm [2+] : left 2+ [Ankle Swelling (On Exam)] : not present [Varicose Veins Of Lower Extremities] : present [Varicose Veins Of The Left Leg] : of the left leg [Ankle Swelling On The Left] : moderate [] : not present [Abdomen Tenderness] : ~T ~M No abdominal tenderness [No Rash or Lesion] : No rash or lesion [Skin Ulcer] : no ulcer [Alert] : alert [Calm] : calm [de-identified] : Appears well

## 2024-01-18 NOTE — HISTORY OF PRESENT ILLNESS
[FreeTextEntry1] : 68 year-old gentleman with a remote history of a right lower extremity DVT which developed after a flight from [place].  At that time he was on a course of Xarelto and has subsequently been without issue.  Several months ago patient developed left leg SVT.  He notes pain has subsided.  No swelling at this time.  He has a history of left greater saphenous vein stripping and ligation many years ago.  Patient  presented to the office with left thigh pain and was found to have acute thrombophlebitis.  He was treated with warm compresses and baby aspirin.  He notes that there is still discomfort at times when he touches the left thigh.  The calf

## 2024-02-07 ENCOUNTER — APPOINTMENT (OUTPATIENT)
Dept: INTERNAL MEDICINE | Facility: CLINIC | Age: 69
End: 2024-02-07
Payer: MEDICARE

## 2024-02-07 VITALS — DIASTOLIC BLOOD PRESSURE: 74 MMHG | SYSTOLIC BLOOD PRESSURE: 118 MMHG

## 2024-02-07 VITALS
DIASTOLIC BLOOD PRESSURE: 80 MMHG | WEIGHT: 205 LBS | BODY MASS INDEX: 30.36 KG/M2 | SYSTOLIC BLOOD PRESSURE: 138 MMHG | HEIGHT: 69 IN | OXYGEN SATURATION: 96 % | HEART RATE: 61 BPM

## 2024-02-07 DIAGNOSIS — M10.9 GOUT, UNSPECIFIED: ICD-10-CM

## 2024-02-07 DIAGNOSIS — D69.6 THROMBOCYTOPENIA, UNSPECIFIED: ICD-10-CM

## 2024-02-07 DIAGNOSIS — K21.9 GASTRO-ESOPHAGEAL REFLUX DISEASE W/OUT ESOPHAGITIS: ICD-10-CM

## 2024-02-07 DIAGNOSIS — D12.6 BENIGN NEOPLASM OF COLON, UNSPECIFIED: ICD-10-CM

## 2024-02-07 DIAGNOSIS — R73.9 HYPERGLYCEMIA, UNSPECIFIED: ICD-10-CM

## 2024-02-07 PROCEDURE — 99213 OFFICE O/P EST LOW 20 MIN: CPT | Mod: 25

## 2024-02-07 PROCEDURE — 36415 COLL VENOUS BLD VENIPUNCTURE: CPT

## 2024-02-07 RX ORDER — METHYLPREDNISOLONE 4 MG/1
4 TABLET ORAL
Qty: 1 | Refills: 0 | Status: ACTIVE | COMMUNITY
Start: 2023-09-06 | End: 1900-01-01

## 2024-02-07 RX ORDER — LEVOFLOXACIN 500 MG/1
500 TABLET, FILM COATED ORAL DAILY
Qty: 7 | Refills: 0 | Status: DISCONTINUED | COMMUNITY
Start: 2023-10-30 | End: 2024-02-07

## 2024-02-07 NOTE — HISTORY OF PRESENT ILLNESS
[FreeTextEntry1] : seeing Jaydon for posterior neck mass had ultrasound and told to follow in 6 months. needs to f/u  saw vascular doctor f/u wear support socks on both legs  s/p gel shots in both knees  no further kidney stones  has small umbilical hernia  retiring Sept 20- now busy with grandkids  gout right wrist 3 weeks ago.

## 2024-02-08 LAB
ALBUMIN SERPL ELPH-MCNC: 4.2 G/DL
ALP BLD-CCNC: 75 U/L
ALT SERPL-CCNC: 19 U/L
ANION GAP SERPL CALC-SCNC: 12 MMOL/L
AST SERPL-CCNC: 17 U/L
BILIRUB SERPL-MCNC: 0.5 MG/DL
BUN SERPL-MCNC: 24 MG/DL
CALCIUM SERPL-MCNC: 8.6 MG/DL
CHLORIDE SERPL-SCNC: 105 MMOL/L
CO2 SERPL-SCNC: 23 MMOL/L
CREAT SERPL-MCNC: 1.15 MG/DL
EGFR: 69 ML/MIN/1.73M2
ESTIMATED AVERAGE GLUCOSE: 123 MG/DL
GLUCOSE SERPL-MCNC: 151 MG/DL
HBA1C MFR BLD HPLC: 5.9 %
POTASSIUM SERPL-SCNC: 4.3 MMOL/L
PROT SERPL-MCNC: 6.7 G/DL
SODIUM SERPL-SCNC: 140 MMOL/L
URATE SERPL-MCNC: 7.4 MG/DL

## 2024-04-02 ENCOUNTER — NON-APPOINTMENT (OUTPATIENT)
Age: 69
End: 2024-04-02

## 2024-04-02 LAB
ANION GAP SERPL CALC-SCNC: 9 MMOL/L
BUN SERPL-MCNC: 24 MG/DL
CALCIUM SERPL-MCNC: 8.9 MG/DL
CHLORIDE SERPL-SCNC: 105 MMOL/L
CO2 SERPL-SCNC: 26 MMOL/L
CREAT SERPL-MCNC: 1.28 MG/DL
EGFR: 61 ML/MIN/1.73M2
GLUCOSE SERPL-MCNC: 91 MG/DL
HCT VFR BLD CALC: 48.4 %
HGB BLD-MCNC: 15.9 G/DL
MCHC RBC-ENTMCNC: 29.3 PG
MCHC RBC-ENTMCNC: 32.9 GM/DL
MCV RBC AUTO: 89.1 FL
PLATELET # BLD AUTO: 122 K/UL
POTASSIUM SERPL-SCNC: 4.8 MMOL/L
RBC # BLD: 5.43 M/UL
RBC # FLD: 13.5 %
SODIUM SERPL-SCNC: 140 MMOL/L
WBC # FLD AUTO: 7.58 K/UL

## 2024-04-26 ENCOUNTER — LABORATORY RESULT (OUTPATIENT)
Age: 69
End: 2024-04-26

## 2024-04-26 ENCOUNTER — APPOINTMENT (OUTPATIENT)
Dept: ENDOVASCULAR SURGERY | Facility: CLINIC | Age: 69
End: 2024-04-26
Payer: MEDICARE

## 2024-04-26 VITALS
DIASTOLIC BLOOD PRESSURE: 81 MMHG | TEMPERATURE: 98 F | BODY MASS INDEX: 30.36 KG/M2 | OXYGEN SATURATION: 97 % | HEIGHT: 69 IN | HEART RATE: 60 BPM | WEIGHT: 205 LBS | SYSTOLIC BLOOD PRESSURE: 136 MMHG | RESPIRATION RATE: 18 BRPM

## 2024-04-26 PROCEDURE — 37765 STAB PHLEB VEINS XTR 10-20: CPT | Mod: LT

## 2024-04-26 PROCEDURE — 36475 ENDOVENOUS RF 1ST VEIN: CPT | Mod: LT

## 2024-04-26 NOTE — HISTORY OF PRESENT ILLNESS
[FreeTextEntry1] : accompanied by wife Katherine  took no medications this morning  [FreeTextEntry5] : yesterday at 7pm [FreeTextEntry6] : Dr. Watson

## 2024-04-26 NOTE — PROCEDURE
[D/C IV on discharge] : D/C IV on discharge [Resume diet] : resume diet [Dressing checked for bleeding] : Dressing checked for bleeding [Vital signs on admission the q 15 mins x2] : Vital signs on admission the q 15 mins x2 [FreeTextEntry1] : Given the symptoms in the office today patient underwent a duplex study which shows no evidence of DVT.  The left greater saphenous vein is ablated throughout the thigh.  There is a thrombosed subacute greater saphenous vein tributary.   plan for  ablation of the vein in the calf along with a phlebectomy.

## 2024-04-30 ENCOUNTER — APPOINTMENT (OUTPATIENT)
Dept: VASCULAR SURGERY | Facility: CLINIC | Age: 69
End: 2024-04-30
Payer: MEDICARE

## 2024-04-30 DIAGNOSIS — I87.2 VENOUS INSUFFICIENCY (CHRONIC) (PERIPHERAL): ICD-10-CM

## 2024-04-30 PROCEDURE — 93971 EXTREMITY STUDY: CPT | Mod: LT

## 2024-04-30 PROCEDURE — 99024 POSTOP FOLLOW-UP VISIT: CPT

## 2024-04-30 NOTE — DISCUSSION/SUMMARY
[FreeTextEntry1] : Stab sites are clean dry and intact.  Duplex demonstrates successful ablation of the left greater and smaller saphenous vein with no evidence of DVT.  Follow-up 1 month.

## 2024-04-30 NOTE — REASON FOR VISIT
[de-identified] : Radiofrequency ablation left greater saphenous vein with stab phlebectomy [de-identified] : 4/26/2024 [de-identified] : Status post ablation.  Patient reports pain is well-controlled.  Denies fever or chills.

## 2024-05-22 NOTE — ED PROVIDER NOTE - PMH
DVT (deep venous thrombosis)    Epistaxis    Gout  right toe  Herniated lumbar intervertebral disc    Pulmonary embolism    Saddle embolus  sept 16th  Spinal stenosis
Size Of Lesion In Cm (Optional): 0
Detail Level: Detailed
Procedure To Be Performed At Next Visit: Cryotherapy
Introduction Text (Please End With A Colon): The following procedure was deferred:

## 2024-06-04 ENCOUNTER — APPOINTMENT (OUTPATIENT)
Dept: VASCULAR SURGERY | Facility: CLINIC | Age: 69
End: 2024-06-04
Payer: MEDICARE

## 2024-06-04 PROCEDURE — 99214 OFFICE O/P EST MOD 30 MIN: CPT

## 2024-06-04 NOTE — PROCEDURE
[FreeTextEntry1] : Patient presents to the office today for follow-up.  No complaints.  He is doing well.  There is no need for further workup.  Patient may follow-up with as needed.

## 2024-06-04 NOTE — PHYSICAL EXAM
[JVD] : no jugular venous distention  [Normal Breath Sounds] : Normal breath sounds [Normal Rate and Rhythm] : normal rate and rhythm [2+] : left 2+ [Ankle Swelling (On Exam)] : not present [Varicose Veins Of Lower Extremities] : present [Varicose Veins Of The Left Leg] : of the left leg [Ankle Swelling On The Left] : moderate [] : not present [Abdomen Tenderness] : ~T ~M No abdominal tenderness [No Rash or Lesion] : No rash or lesion [Skin Ulcer] : no ulcer [Alert] : alert [Calm] : calm [de-identified] : Appears well

## 2024-06-04 NOTE — HISTORY OF PRESENT ILLNESS
[FreeTextEntry1] : 68 year-old gentleman with a remote history of a right lower extremity DVT which developed after a flight from [place].  At that time he was on a course of Xarelto and has subsequently been without issue.  Several months ago patient developed left leg SVT.  He notes pain has subsided.  No swelling at this time.  He has a history of left greater saphenous vein stripping and ligation many years ago.  Patient  presented to the office with left thigh pain and was found to have acute thrombophlebitis.  He was treated with warm compresses and baby aspirin.  Several months later patient underwent left greater saphenous vein ablation with phlebectomy.  No complaints since that time.

## 2024-08-12 DIAGNOSIS — D12.6 BENIGN NEOPLASM OF COLON, UNSPECIFIED: ICD-10-CM

## 2024-08-12 RX ORDER — SODIUM SULFATE, POTASSIUM SULFATE AND MAGNESIUM SULFATE 1.6; 3.13; 17.5 G/177ML; G/177ML; G/177ML
17.5-3.13-1.6 SOLUTION ORAL
Qty: 2 | Refills: 0 | Status: ACTIVE | COMMUNITY
Start: 2024-08-12 | End: 1900-01-01

## 2024-09-09 ENCOUNTER — APPOINTMENT (OUTPATIENT)
Dept: INTERNAL MEDICINE | Facility: AMBULATORY MEDICAL SERVICES | Age: 69
End: 2024-09-09
Payer: MEDICARE

## 2024-09-09 PROCEDURE — 45380 COLONOSCOPY AND BIOPSY: CPT

## 2024-09-12 DIAGNOSIS — E53.8 DEFICIENCY OF OTHER SPECIFIED B GROUP VITAMINS: ICD-10-CM

## 2024-09-12 DIAGNOSIS — M1A.0710 IDIOPATHIC CHRONIC GOUT, RIGHT ANKLE AND FOOT, W/OUT TOPHUS (TOPHI): ICD-10-CM

## 2024-09-12 DIAGNOSIS — I25.10 ATHEROSCLEROTIC HEART DISEASE OF NATIVE CORONARY ARTERY W/OUT ANGINA PECTORIS: ICD-10-CM

## 2024-09-12 DIAGNOSIS — E07.9 DISORDER OF THYROID, UNSPECIFIED: ICD-10-CM

## 2024-09-12 DIAGNOSIS — R73.9 HYPERGLYCEMIA, UNSPECIFIED: ICD-10-CM

## 2024-09-12 DIAGNOSIS — D69.6 THROMBOCYTOPENIA, UNSPECIFIED: ICD-10-CM

## 2024-09-12 DIAGNOSIS — E55.9 VITAMIN D DEFICIENCY, UNSPECIFIED: ICD-10-CM

## 2024-09-27 DIAGNOSIS — R35.1 BENIGN PROSTATIC HYPERPLASIA WITH LOWER URINARY TRACT SYMPMS: ICD-10-CM

## 2024-09-27 DIAGNOSIS — N40.1 BENIGN PROSTATIC HYPERPLASIA WITH LOWER URINARY TRACT SYMPMS: ICD-10-CM

## 2024-09-27 DIAGNOSIS — R73.9 HYPERGLYCEMIA, UNSPECIFIED: ICD-10-CM

## 2024-09-30 LAB
APPEARANCE: CLEAR
BILIRUBIN URINE: NEGATIVE
BLOOD URINE: NEGATIVE
COLOR: YELLOW
GLUCOSE QUALITATIVE U: NEGATIVE MG/DL
KETONES URINE: NEGATIVE MG/DL
LEUKOCYTE ESTERASE URINE: NEGATIVE
NITRITE URINE: NEGATIVE
PH URINE: 5.5
PROTEIN URINE: NEGATIVE MG/DL
PSA SERPL-MCNC: 1.09 NG/ML
SPECIFIC GRAVITY URINE: 1.02
UROBILINOGEN URINE: 0.2 MG/DL

## 2024-10-09 DIAGNOSIS — R00.2 PALPITATIONS: ICD-10-CM

## 2024-10-10 ENCOUNTER — NON-APPOINTMENT (OUTPATIENT)
Age: 69
End: 2024-10-10

## 2024-10-10 ENCOUNTER — APPOINTMENT (OUTPATIENT)
Dept: INTERNAL MEDICINE | Facility: CLINIC | Age: 69
End: 2024-10-10
Payer: MEDICARE

## 2024-10-10 VITALS
HEART RATE: 55 BPM | SYSTOLIC BLOOD PRESSURE: 140 MMHG | BODY MASS INDEX: 30.36 KG/M2 | DIASTOLIC BLOOD PRESSURE: 80 MMHG | OXYGEN SATURATION: 98 % | HEIGHT: 69 IN | WEIGHT: 205 LBS

## 2024-10-10 VITALS — DIASTOLIC BLOOD PRESSURE: 78 MMHG | SYSTOLIC BLOOD PRESSURE: 128 MMHG

## 2024-10-10 DIAGNOSIS — Z00.00 ENCOUNTER FOR GENERAL ADULT MEDICAL EXAMINATION W/OUT ABNORMAL FINDINGS: ICD-10-CM

## 2024-10-10 DIAGNOSIS — M10.9 GOUT, UNSPECIFIED: ICD-10-CM

## 2024-10-10 DIAGNOSIS — R97.20 ELEVATED PROSTATE, SPECIFIC ANTIGEN [PSA]: ICD-10-CM

## 2024-10-10 DIAGNOSIS — K21.9 GASTRO-ESOPHAGEAL REFLUX DISEASE W/OUT ESOPHAGITIS: ICD-10-CM

## 2024-10-10 DIAGNOSIS — I87.2 VENOUS INSUFFICIENCY (CHRONIC) (PERIPHERAL): ICD-10-CM

## 2024-10-10 DIAGNOSIS — Z23 ENCOUNTER FOR IMMUNIZATION: ICD-10-CM

## 2024-10-10 PROCEDURE — G0008: CPT

## 2024-10-10 PROCEDURE — G0439: CPT

## 2024-10-10 PROCEDURE — 93000 ELECTROCARDIOGRAM COMPLETE: CPT

## 2024-10-10 PROCEDURE — 90662 IIV NO PRSV INCREASED AG IM: CPT

## 2025-02-22 ENCOUNTER — NON-APPOINTMENT (OUTPATIENT)
Age: 70
End: 2025-02-22

## 2025-03-05 ENCOUNTER — APPOINTMENT (OUTPATIENT)
Dept: INTERNAL MEDICINE | Facility: CLINIC | Age: 70
End: 2025-03-05
Payer: MEDICARE

## 2025-03-05 VITALS
OXYGEN SATURATION: 98 % | HEART RATE: 65 BPM | WEIGHT: 205 LBS | HEIGHT: 69 IN | DIASTOLIC BLOOD PRESSURE: 78 MMHG | SYSTOLIC BLOOD PRESSURE: 125 MMHG | BODY MASS INDEX: 30.36 KG/M2

## 2025-03-05 VITALS — DIASTOLIC BLOOD PRESSURE: 80 MMHG | SYSTOLIC BLOOD PRESSURE: 124 MMHG

## 2025-03-05 DIAGNOSIS — B05.9 MEASLES W/OUT COMPLICATION: ICD-10-CM

## 2025-03-05 DIAGNOSIS — R73.9 HYPERGLYCEMIA, UNSPECIFIED: ICD-10-CM

## 2025-03-05 DIAGNOSIS — K21.9 GASTRO-ESOPHAGEAL REFLUX DISEASE W/OUT ESOPHAGITIS: ICD-10-CM

## 2025-03-05 DIAGNOSIS — I25.10 ATHEROSCLEROTIC HEART DISEASE OF NATIVE CORONARY ARTERY W/OUT ANGINA PECTORIS: ICD-10-CM

## 2025-03-05 DIAGNOSIS — R42 DIZZINESS AND GIDDINESS: ICD-10-CM

## 2025-03-05 DIAGNOSIS — M10.9 GOUT, UNSPECIFIED: ICD-10-CM

## 2025-03-05 PROCEDURE — 36415 COLL VENOUS BLD VENIPUNCTURE: CPT

## 2025-03-05 PROCEDURE — G2211 COMPLEX E/M VISIT ADD ON: CPT

## 2025-03-05 PROCEDURE — 99214 OFFICE O/P EST MOD 30 MIN: CPT

## 2025-03-06 LAB
ALBUMIN SERPL ELPH-MCNC: 4.5 G/DL
ALP BLD-CCNC: 81 U/L
ALT SERPL-CCNC: 22 U/L
ANION GAP SERPL CALC-SCNC: 13 MMOL/L
AST SERPL-CCNC: 18 U/L
BILIRUB SERPL-MCNC: 0.5 MG/DL
BUN SERPL-MCNC: 24 MG/DL
CALCIUM SERPL-MCNC: 9.3 MG/DL
CHLORIDE SERPL-SCNC: 103 MMOL/L
CHOLEST SERPL-MCNC: 147 MG/DL
CK SERPL-CCNC: 59 U/L
CO2 SERPL-SCNC: 24 MMOL/L
CREAT SERPL-MCNC: 1.01 MG/DL
EGFRCR SERPLBLD CKD-EPI 2021: 80 ML/MIN/1.73M2
ESTIMATED AVERAGE GLUCOSE: 131 MG/DL
GLUCOSE SERPL-MCNC: 125 MG/DL
HBA1C MFR BLD HPLC: 6.2 %
HDLC SERPL-MCNC: 37 MG/DL
LDLC SERPL CALC-MCNC: 62 MG/DL
MEV IGG FLD QL IA: >300 AU/ML
MEV IGG+IGM SER-IMP: POSITIVE
NONHDLC SERPL-MCNC: 110 MG/DL
POTASSIUM SERPL-SCNC: 4.4 MMOL/L
PROT SERPL-MCNC: 7.3 G/DL
SODIUM SERPL-SCNC: 139 MMOL/L
TRIGL SERPL-MCNC: 309 MG/DL
URATE SERPL-MCNC: 6.9 MG/DL

## 2025-05-09 ENCOUNTER — APPOINTMENT (OUTPATIENT)
Dept: ORTHOPEDIC SURGERY | Facility: CLINIC | Age: 70
End: 2025-05-09
Payer: MEDICARE

## 2025-05-09 DIAGNOSIS — M65.332 TRIGGER FINGER, LEFT MIDDLE FINGER: ICD-10-CM

## 2025-05-09 DIAGNOSIS — M47.817 SPONDYLOSIS W/OUT MYELOPATHY OR RADICULOPATHY, LUMBOSACRAL REGION: ICD-10-CM

## 2025-05-09 DIAGNOSIS — M65.331 TRIGGER FINGER, RIGHT MIDDLE FINGER: ICD-10-CM

## 2025-05-09 DIAGNOSIS — M65.351 TRIGGER FINGER, RIGHT LITTLE FINGER: ICD-10-CM

## 2025-05-09 PROCEDURE — 99214 OFFICE O/P EST MOD 30 MIN: CPT | Mod: 25

## 2025-05-09 PROCEDURE — 20550 NJX 1 TENDON SHEATH/LIGAMENT: CPT | Mod: RT

## 2025-05-09 PROCEDURE — 72100 X-RAY EXAM L-S SPINE 2/3 VWS: CPT

## 2025-05-09 RX ORDER — DICLOFENAC SODIUM 75 MG/1
75 TABLET, DELAYED RELEASE ORAL
Qty: 60 | Refills: 0 | Status: ACTIVE | COMMUNITY
Start: 2025-05-09 | End: 1900-01-01

## 2025-05-22 ENCOUNTER — APPOINTMENT (OUTPATIENT)
Dept: SURGERY | Facility: CLINIC | Age: 70
End: 2025-05-22
Payer: MEDICARE

## 2025-05-22 VITALS
WEIGHT: 205 LBS | SYSTOLIC BLOOD PRESSURE: 132 MMHG | DIASTOLIC BLOOD PRESSURE: 73 MMHG | HEART RATE: 56 BPM | BODY MASS INDEX: 30.36 KG/M2 | HEIGHT: 69 IN

## 2025-05-22 DIAGNOSIS — R22.1 LOCALIZED SWELLING, MASS AND LUMP, NECK: ICD-10-CM

## 2025-05-22 PROCEDURE — 99203 OFFICE O/P NEW LOW 30 MIN: CPT

## 2025-06-05 ENCOUNTER — OUTPATIENT (OUTPATIENT)
Dept: OUTPATIENT SERVICES | Facility: HOSPITAL | Age: 70
LOS: 1 days | End: 2025-06-05

## 2025-06-05 VITALS
OXYGEN SATURATION: 97 % | HEART RATE: 62 BPM | HEIGHT: 66 IN | RESPIRATION RATE: 16 BRPM | WEIGHT: 207.9 LBS | TEMPERATURE: 98 F | SYSTOLIC BLOOD PRESSURE: 125 MMHG | DIASTOLIC BLOOD PRESSURE: 78 MMHG

## 2025-06-05 DIAGNOSIS — Z98.89 OTHER SPECIFIED POSTPROCEDURAL STATES: Chronic | ICD-10-CM

## 2025-06-05 DIAGNOSIS — I82.409 ACUTE EMBOLISM AND THROMBOSIS OF UNSPECIFIED DEEP VEINS OF UNSPECIFIED LOWER EXTREMITY: ICD-10-CM

## 2025-06-05 DIAGNOSIS — R22.9 LOCALIZED SWELLING, MASS AND LUMP, UNSPECIFIED: ICD-10-CM

## 2025-06-05 DIAGNOSIS — R22.1 LOCALIZED SWELLING, MASS AND LUMP, NECK: ICD-10-CM

## 2025-06-05 NOTE — H&P PST ADULT - NSICDXPASTSURGICALHX_GEN_ALL_CORE_FT
PAST SURGICAL HISTORY:  History of orthopedic surgery     S/P IVC filter located right groin   place on 11/2015

## 2025-06-05 NOTE — H&P PST ADULT - SKIN/BREAST COMMENTS
Swelling / lump posterior right  neck for many years - pt now for removal -Pt denies pain or infection

## 2025-06-05 NOTE — H&P PST ADULT - NSICDXPASTMEDICALHX_GEN_ALL_CORE_FT
PAST MEDICAL HISTORY:  DVT (deep venous thrombosis)     Epistaxis     Gout right toe    Herniated lumbar intervertebral disc     Localized soft tissue swelling     Pulmonary embolism     Saddle embolus sept 16th    Spinal stenosis      PAST MEDICAL HISTORY:  Chronic idiopathic thrombocytopenia     DVT (deep venous thrombosis)     Epistaxis     Gout right toe    Herniated lumbar intervertebral disc     Localized soft tissue swelling     Pulmonary embolism     Saddle embolus sept 16th    Spinal stenosis

## 2025-06-05 NOTE — H&P PST ADULT - NSICDXFAMILYHX_GEN_ALL_CORE_FT
FAMILY HISTORY:  Father  Still living? Unknown  Family history of DVT, Age at diagnosis: Age Unknown    Sibling  Still living? Yes, Estimated age: Age Unknown  Family history of lymphoma, Age at diagnosis: Age Unknown    Grandparent  Still living? Unknown  Family history of DVT, Age at diagnosis: Age Unknown

## 2025-06-05 NOTE — H&P PST ADULT - CARDIOVASCULAR COMMENTS
Hx DVT 2015 following plane flight - hospitalized for DVT/PE with IVC filter placed and removed - no recurrence

## 2025-06-05 NOTE — H&P PST ADULT - HISTORY OF PRESENT ILLNESS
Pt is a 69 yr old male scheduled for Excision of Neck Mass with Dr Interiano 6/13/25 - pt with 2 small lumps right posterior neck - 1 above the other - pt now for removal - pt denies pain or infection - Pt hx HLD, DVT/PE 2015 following plane flight ( IVC filter placed and removed ) , chronic thrombocytopenia - pt very active

## 2025-06-13 ENCOUNTER — APPOINTMENT (OUTPATIENT)
Dept: SURGERY | Facility: HOSPITAL | Age: 70
End: 2025-06-13

## 2025-06-13 ENCOUNTER — TRANSCRIPTION ENCOUNTER (OUTPATIENT)
Age: 70
End: 2025-06-13

## 2025-06-13 ENCOUNTER — OUTPATIENT (OUTPATIENT)
Dept: OUTPATIENT SERVICES | Facility: HOSPITAL | Age: 70
LOS: 1 days | End: 2025-06-13
Payer: MEDICARE

## 2025-06-13 VITALS
SYSTOLIC BLOOD PRESSURE: 104 MMHG | TEMPERATURE: 98 F | RESPIRATION RATE: 17 BRPM | OXYGEN SATURATION: 96 % | DIASTOLIC BLOOD PRESSURE: 70 MMHG | HEART RATE: 68 BPM

## 2025-06-13 VITALS
DIASTOLIC BLOOD PRESSURE: 82 MMHG | OXYGEN SATURATION: 96 % | HEART RATE: 52 BPM | HEIGHT: 66 IN | TEMPERATURE: 98 F | SYSTOLIC BLOOD PRESSURE: 137 MMHG | WEIGHT: 207.9 LBS | RESPIRATION RATE: 16 BRPM

## 2025-06-13 DIAGNOSIS — R22.1 LOCALIZED SWELLING, MASS AND LUMP, NECK: ICD-10-CM

## 2025-06-13 DIAGNOSIS — Z98.89 OTHER SPECIFIED POSTPROCEDURAL STATES: Chronic | ICD-10-CM

## 2025-06-13 PROCEDURE — 21555 EXC NECK LES SC < 3 CM: CPT | Mod: 59

## 2025-06-13 PROCEDURE — 88304 TISSUE EXAM BY PATHOLOGIST: CPT | Mod: 26

## 2025-06-13 PROCEDURE — 88305 TISSUE EXAM BY PATHOLOGIST: CPT | Mod: 26

## 2025-06-13 PROCEDURE — 21556 EXC NECK TUM DEEP < 5 CM: CPT

## 2025-06-13 RX ORDER — ATORVASTATIN CALCIUM 80 MG/1
1 TABLET, FILM COATED ORAL
Refills: 0 | DISCHARGE

## 2025-06-13 RX ORDER — ACETAMINOPHEN 500 MG/5ML
1000 LIQUID (ML) ORAL EVERY 6 HOURS
Refills: 0 | Status: ACTIVE | OUTPATIENT
Start: 2025-06-13 | End: 2026-05-12

## 2025-06-13 RX ORDER — ACETAMINOPHEN 500 MG/5ML
2 LIQUID (ML) ORAL
Qty: 0 | Refills: 0 | DISCHARGE
Start: 2025-06-13

## 2025-06-13 RX ORDER — SODIUM CHLORIDE 9 G/1000ML
1000 INJECTION, SOLUTION INTRAVENOUS
Refills: 0 | Status: ACTIVE | OUTPATIENT
Start: 2025-06-13 | End: 2026-05-12

## 2025-06-13 RX ORDER — ASPIRIN 325 MG
1 TABLET ORAL
Refills: 0 | DISCHARGE

## 2025-06-13 RX ORDER — DICLOFENAC SODIUM 75 MG/1
1 TABLET, DELAYED RELEASE ORAL
Refills: 0 | DISCHARGE

## 2025-06-13 NOTE — ASU PREOPERATIVE ASSESSMENT, ADULT (IPARK ONLY) - FALL HARM RISK - UNIVERSAL INTERVENTIONS
Bed in lowest position, wheels locked, appropriate side rails in place/Call bell, personal items and telephone in reach/Instruct patient to call for assistance before getting out of bed or chair/Non-slip footwear when patient is out of bed/[place] to call system/Physically safe environment - no spills, clutter or unnecessary equipment/Purposeful Proactive Rounding/Room/bathroom lighting operational, light cord in reach

## 2025-06-13 NOTE — BRIEF OPERATIVE NOTE - OPERATION/FINDINGS
Two neck masses identified. Skin incised over superior neck mass. Cyst dissected from subcutaneous tissues, excised and sent as specimen.     Inferior neck mass removed in similar fashion     Subcutaneous tissue closed with vicryl. Skin closed with monocryl

## 2025-06-13 NOTE — ASU DISCHARGE PLAN (ADULT/PEDIATRIC) - CARE PROVIDER_API CALL
[facility]  [address] [place] [address] [address]  [place] [address]  Phone: [phone]  Fax: [phone]  Established Patient  Scheduled Appointment: 06/26/2025 09:30 AM

## 2025-06-13 NOTE — ASU DISCHARGE PLAN (ADULT/PEDIATRIC) - FINANCIAL ASSISTANCE
Called and spoke with patient regarding  recommendations per Dr Licea.  Opportunity for questions provided. Patient has no further questions at this time and stated they understood and was very appreciative of the follow up.    [facility] provides services at a reduced cost to those who are determined to be eligible through [facility]’s financial assistance program. Information regarding [facility]’s financial assistance program can be found by going to [email or web address] or by calling [phone].

## 2025-06-13 NOTE — ASU PREOPERATIVE ASSESSMENT, ADULT (IPARK ONLY) - FALL HARM RISK - FALL HARM RISK
No indicators present [Patient Intake Form Reviewed] : Patient intake form was reviewed [Negative] : Heme/Lymph

## 2025-06-13 NOTE — ASU DISCHARGE PLAN (ADULT/PEDIATRIC) - PROVIDER TOKENS
PROVIDER:[TOKEN:[[id number]],SCHEDULEDAPPT:[06/26/2025],SCHEDULEDAPPTTIME:[09:30 AM],ESTABLISHEDPATIENT:[T]]

## 2025-06-23 LAB — SURGICAL PATHOLOGY STUDY: SIGNIFICANT CHANGE UP

## 2025-06-26 ENCOUNTER — APPOINTMENT (OUTPATIENT)
Dept: SURGERY | Facility: CLINIC | Age: 70
End: 2025-06-26
Payer: MEDICARE

## 2025-06-26 VITALS — BODY MASS INDEX: 30.36 KG/M2 | WEIGHT: 205 LBS | HEIGHT: 69 IN

## 2025-06-26 PROCEDURE — 99024 POSTOP FOLLOW-UP VISIT: CPT

## (undated) DEVICE — DRAPE THYROID 77" X 123"

## (undated) DEVICE — LABELS BLANK W PEN

## (undated) DEVICE — ELCTR BOVIE TIP BLADE INSULATED 2.75" EDGE

## (undated) DEVICE — ELCTR GROUNDING PAD ADULT COVIDIEN

## (undated) DEVICE — DRSG STERISTRIPS 0.5 X 4"

## (undated) DEVICE — PROTECTOR HEEL / ELBOW FLUFFY

## (undated) DEVICE — WARMING BLANKET LOWER ADULT

## (undated) DEVICE — PREP BETADINE KIT

## (undated) DEVICE — SUT CHROMIC 3-0 27" SH

## (undated) DEVICE — VENODYNE/SCD SLEEVE CALF MEDIUM

## (undated) DEVICE — SUT MONOCRYL 4-0 27" PS-2 UNDYED

## (undated) DEVICE — GLV 7 PROTEXIS (WHITE)

## (undated) DEVICE — ELCTR ROCKER SWITCH PENCIL BLUE 10FT

## (undated) DEVICE — POSITIONER FOAM EGG CRATE ULNAR 2PCS (PINK)

## (undated) DEVICE — DRSG MASTISOL

## (undated) DEVICE — PACK MINOR NO DRAPE